# Patient Record
Sex: MALE | Race: BLACK OR AFRICAN AMERICAN | NOT HISPANIC OR LATINO | Employment: UNEMPLOYED | URBAN - METROPOLITAN AREA
[De-identification: names, ages, dates, MRNs, and addresses within clinical notes are randomized per-mention and may not be internally consistent; named-entity substitution may affect disease eponyms.]

---
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
## 2020-09-04 ENCOUNTER — OFFICE VISIT (OUTPATIENT)
Dept: PEDIATRICS | Facility: CLINIC | Age: 20
End: 2020-09-04
Payer: COMMERCIAL

## 2020-09-04 ENCOUNTER — ANCILLARY PROCEDURE (OUTPATIENT)
Dept: GENERAL RADIOLOGY | Facility: CLINIC | Age: 20
End: 2020-09-04
Attending: PEDIATRICS
Payer: COMMERCIAL

## 2020-09-04 VITALS — TEMPERATURE: 98.8 F | HEIGHT: 68 IN | BODY MASS INDEX: 18.43 KG/M2 | WEIGHT: 121.6 LBS

## 2020-09-04 DIAGNOSIS — M54.6 CHRONIC LEFT-SIDED THORACIC BACK PAIN: ICD-10-CM

## 2020-09-04 DIAGNOSIS — M54.6 CHRONIC LEFT-SIDED THORACIC BACK PAIN: Primary | ICD-10-CM

## 2020-09-04 DIAGNOSIS — G89.29 CHRONIC LEFT-SIDED THORACIC BACK PAIN: Primary | ICD-10-CM

## 2020-09-04 DIAGNOSIS — G89.29 CHRONIC LEFT-SIDED THORACIC BACK PAIN: ICD-10-CM

## 2020-09-04 PROCEDURE — 72100 X-RAY EXAM L-S SPINE 2/3 VWS: CPT | Mod: TC

## 2020-09-04 PROCEDURE — 99213 OFFICE O/P EST LOW 20 MIN: CPT | Performed by: PEDIATRICS

## 2020-09-04 ASSESSMENT — ASTHMA QUESTIONNAIRES
QUESTION_4 LAST FOUR WEEKS HOW OFTEN HAVE YOU USED YOUR RESCUE INHALER OR NEBULIZER MEDICATION (SUCH AS ALBUTEROL): ONCE A WEEK OR LESS
QUESTION_2 LAST FOUR WEEKS HOW OFTEN HAVE YOU HAD SHORTNESS OF BREATH: ONCE OR TWICE A WEEK
QUESTION_3 LAST FOUR WEEKS HOW OFTEN DID YOUR ASTHMA SYMPTOMS (WHEEZING, COUGHING, SHORTNESS OF BREATH, CHEST TIGHTNESS OR PAIN) WAKE YOU UP AT NIGHT OR EARLIER THAN USUAL IN THE MORNING: ONCE OR TWICE
QUESTION_5 LAST FOUR WEEKS HOW WOULD YOU RATE YOUR ASTHMA CONTROL: WELL CONTROLLED
HOSPITALIZATION_OVERNIGHT_LAST_YEAR_TOTAL: ONE
ACT_TOTALSCORE: 20
QUESTION_1 LAST FOUR WEEKS HOW MUCH OF THE TIME DID YOUR ASTHMA KEEP YOU FROM GETTING AS MUCH DONE AT WORK, SCHOOL OR AT HOME: A LITTLE OF THE TIME
EMERGENCY_ROOM_LAST_YEAR_TOTAL: ONE

## 2020-09-04 ASSESSMENT — MIFFLIN-ST. JEOR: SCORE: 1540.94

## 2020-09-04 NOTE — PATIENT INSTRUCTIONS
The xray was normal.  This appears to entirely muscle in origin.  Physical Therapy will be the best treatment option.  In the meanwhile. If you can feel the knot in your muscle, put 30 seconds of pressure over that spot.

## 2020-09-04 NOTE — PROGRESS NOTES
Subjective    Duncan Dumont is a 20 year old male who presents to clinic today with self because of:  Musculoskeletal Problem (back pain) and Asthma     HPI   Concerns: Patient is here due to back. He said it has been an on going issue, but the last 4-5 days the pain has been super bad and he cant sleep. He said it feels like a cramp/ache. Hard to describe the feeling. It seems to never go away. He use to wrestle and has had back pain since he was young.    Also needs to fill asthma medication.     Here today for chronic back pain, which has been present for years.  He says he always has a steady level of pain which he rates as 7/10.  He has episodes of more intense pain which he rates at 10/10 although he continues to do his normal daily activities throughout.  He does play a lot of basketball, but does not think these cause the exacerbations.  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  This sometimes makes his back hurt more, but I believe it is just intensification of the background pain.  The pain can be worse at night since it keeps him awake.  There is no radiation or weakness.  He does describe a popping sensation in his lungs that he thinks may be associated with the pain.  No myalgia or arthalgia elsewhere.  No systemic symptoms.    Review of Systems  Constitutional, eye, ENT, skin, respiratory, cardiac, and GI are normal except as otherwise noted.    Problem List  Patient Active Problem List    Diagnosis Date Noted     Marijuana abuse 12/21/2017     Priority: Medium     12/21/2017 + cannibis on urine drug screen.         Left knee pain 11/16/2016     Priority: Medium     Non compliance with medical treatment 01/29/2016     Priority: Medium     CPS report filed - letter under [facility] 01/29/2016     Priority: Medium     State Tier Level:  Unknown  Status:  Accepted  Care Coordinator:  Silva Mar RN CC    See Letters for [facility]  Date:   February 10, 2016             Poor compliance 01/27/2016     Priority: Medium     Failure to thrive in child 12/03/2015     Priority: Medium     Asthma exacerbation 08/22/2015     Priority: Medium     Vitamin D insufficiency 11/21/2014     Priority: Medium     12/21/2017 rx'd vit D.        Chronic allergic rhinitis 11/15/2014     Priority: Medium     Short stature 11/15/2014     Priority: Medium     Referred to endocrinology but did not follow through.  Saw endocrine x 1 while in hospital 8/2015.  Failed outpatient fu.  11/2015 - referred back to endocrinology.        Esophageal reflux 10/14/2013     Priority: Medium     Moderate persistent asthma 11/11/2008     Priority: Medium     Poor control.    Hospitalized x3 at [facility] with acute exacerbations.  No intubations.    Triggers:  Change in weather, pollens?  PHS doing in home education.      10/11/2013 not taking controller med.  Wrote new RX for flovent.  (Singulair too expensive for family.)    8/2015 PICU for status asthmaticus.    10/2015 - controller med switched to aerospan based on insurance coverage.  Referred back to pulmonary.    3/30/2016 - seen in clinic for FU.  Mother just had another baby.  ACT = 21 and seems to be compliant with meds.  Pulmonary appt next week.    12/2015 - now back on Advair.  Never started aerospan.  Did not go to pulmonary appointment.    1/2016 PICU for status asthmaticus.  Discharged on Dulera and montelukast.      3/31/2016 FU in clinic and doing better.  Reports regular use of medications and ACT = 21.        Medications  albuterol (VENTOLIN HFA) 108 (90 Base) MCG/ACT inhaler, Inhale 2 puffs into the lungs every 4 hours as needed for shortness of breath / dyspnea or wheezing  fluticasone (FLONASE) 50 MCG/ACT spray, Spray 1 spray into both nostrils daily  albuterol (2.5 MG/3ML) 0.083% neb solution, Take 2 vials (5 mg) by nebulization every 4 hours as needed for shortness of breath / dyspnea or wheezing  ibuprofen (ADVIL/MOTRIN)  "600 MG tablet, Take 1 tablet (600 mg) by mouth every 6 hours as needed for pain  predniSONE (DELTASONE) 20 MG tablet, Take two tablets (= 40mg) each day for 5 (five) days (Patient not taking: Reported on 9/4/2020)    No current facility-administered medications on file prior to visit.     Allergies  Allergies   Allergen Reactions     Cats      Rash, itching, cough, nasal congestion       Reviewed and updated as needed this visit by Provider  Allergies  Meds  Problems  Med Hx           Objective    Temp 98.8  F (37.1  C) (Oral)   Ht 5' 8.31\" (1.735 m)   Wt 121 lb 9.6 oz (55.2 kg)   BMI 18.32 kg/m    Facility age limit for growth percentiles is 20 years.    Physical Exam  GENERAL APPEARANCE: healthy, alert and no distress  ABDOMEN: soft, nontender, no hepatosplenomegaly or masses and bowel sounds normal  SPINE: Back is straight with very little lumbar lordosis.  No tenderness over the spine itself.  Sacroiliac joint without tenderness.  He can rotate, flex, and extend his spine, but all these maneuvers exacerbate the pain.  PARASPINAL MUSCULATURE: The left serratus posterior inferior muscle has pulses palpably more spasm than the right.  This is all concentrated at the margin of the lower rib cage.  There is a very specific bundle that has a very tight knot.    DIAGNOSTICS:  X-ray of spine: Normal except for a mild curvature to the left, consistent with muscle spasm.        Assessment & Plan    1. Chronic left-sided thoracic back pain  Which appears to be muscle in origin.  His spine appears normal.  He has no symptoms that suggest spinal compression.  He did have an odd popping sensation which is why we obtain the x-ray.  PLAN:  With him lying down, we located the point of maximum tenderness and spasm.  Direct firm pressure over that area for 20 to 30 seconds did give him temporary relief from the pain.  I think he can do this himself by using the knuckles of his hands to obtain temporary relief.  He does not " live with anyone, so he will have to do it himself.  Discussed with him that the best option is physical therapy and made a referral.  - XR Lumbar Spine 2/3 Views; Future  - PHYSICAL THERAPY REFERRAL; Future    Follow Up  Return in about 1 year (around 9/4/2021) for Preventive Care Visit with a family practitioner.  Discussed with him that he is at an age where he really does need transfer care to family practice.    Pepito Campoverde MD

## 2020-09-05 ASSESSMENT — ASTHMA QUESTIONNAIRES: ACT_TOTALSCORE: 20

## 2020-10-27 DIAGNOSIS — J45.42 MODERATE PERSISTENT ASTHMA WITH STATUS ASTHMATICUS: ICD-10-CM

## 2020-10-27 DIAGNOSIS — J45.41 MODERATE PERSISTENT ASTHMA WITH EXACERBATION: ICD-10-CM

## 2020-10-27 RX ORDER — ALBUTEROL SULFATE 90 UG/1
2 AEROSOL, METERED RESPIRATORY (INHALATION) EVERY 4 HOURS PRN
Qty: 1 INHALER | Refills: 0 | Status: CANCELLED | OUTPATIENT
Start: 2020-10-27

## 2020-10-27 RX ORDER — ALBUTEROL SULFATE 90 UG/1
1-2 POWDER, METERED RESPIRATORY (INHALATION) EVERY 4 HOURS PRN
Qty: 1 INHALER | Refills: 0 | Status: SHIPPED | OUTPATIENT
Start: 2020-10-27

## 2020-10-27 NOTE — TELEPHONE ENCOUNTER
Reason for Call:  Medication or medication refill:    Do you use a [facility]?  Name of the pharmacy and phone number for the current request:  see attached    Name of the medication requested: albuterol (VENTOLIN HFA) 108 (90 Base) MCG/ACT inhaler    Other request: N/A    Can we leave a detailed message on this number? Not Applicable    Phone number patient can be reached at: Home number on file [phone] (home)    Best Time: N/A    Call taken on 10/27/2020 at 11:36 AM by Aime Canela MA

## 2022-02-02 ENCOUNTER — HOSPITAL ENCOUNTER (EMERGENCY)
Facility: CLINIC | Age: 22
Discharge: HOME | End: 2022-02-03
Attending: PSYCHIATRY & NEUROLOGY | Admitting: PSYCHIATRY & NEUROLOGY
Payer: COMMERCIAL

## 2022-02-02 DIAGNOSIS — F41.9 ANXIETY: ICD-10-CM

## 2022-02-02 DIAGNOSIS — G47.00 PERSISTENT DISORDER OF INITIATING OR MAINTAINING SLEEP: ICD-10-CM

## 2022-02-02 DIAGNOSIS — F43.0 ACUTE REACTION TO STRESS: ICD-10-CM

## 2022-02-02 DIAGNOSIS — F12.90 MARIJUANA SMOKER: ICD-10-CM

## 2022-02-02 LAB
AMPHETAMINES UR QL SCN: ABNORMAL
BARBITURATES UR QL: ABNORMAL
BENZODIAZ UR QL: ABNORMAL
CANNABINOIDS UR QL SCN: ABNORMAL
COCAINE UR QL: ABNORMAL
OPIATES UR QL SCN: ABNORMAL

## 2022-02-02 PROCEDURE — 250N000013 HC RX MED GY IP 250 OP 250 PS 637: Performed by: PSYCHIATRY & NEUROLOGY

## 2022-02-02 PROCEDURE — 80307 DRUG TEST PRSMV CHEM ANLYZR: CPT | Performed by: PSYCHIATRY & NEUROLOGY

## 2022-02-02 PROCEDURE — 99284 EMERGENCY DEPT VISIT MOD MDM: CPT | Performed by: PSYCHIATRY & NEUROLOGY

## 2022-02-02 PROCEDURE — 90791 PSYCH DIAGNOSTIC EVALUATION: CPT

## 2022-02-02 PROCEDURE — 99285 EMERGENCY DEPT VISIT HI MDM: CPT | Mod: 25

## 2022-02-02 RX ORDER — OLANZAPINE 5 MG/1
5 TABLET, ORALLY DISINTEGRATING ORAL AT BEDTIME
Qty: 10 TABLET | Refills: 0 | Status: SHIPPED | OUTPATIENT
Start: 2022-02-02 | End: 2022-02-02

## 2022-02-02 RX ORDER — OLANZAPINE 5 MG/1
5 TABLET, ORALLY DISINTEGRATING ORAL AT BEDTIME
Qty: 10 TABLET | Refills: 0 | Status: SHIPPED | OUTPATIENT
Start: 2022-02-02 | End: 2022-11-29

## 2022-02-02 RX ORDER — OLANZAPINE 10 MG/1
10 TABLET, ORALLY DISINTEGRATING ORAL ONCE
Status: COMPLETED | OUTPATIENT
Start: 2022-02-02 | End: 2022-02-02

## 2022-02-02 RX ADMIN — OLANZAPINE 10 MG: 10 TABLET, ORALLY DISINTEGRATING ORAL at 18:37

## 2022-02-02 ASSESSMENT — ENCOUNTER SYMPTOMS
NEUROLOGICAL NEGATIVE: 1
GASTROINTESTINAL NEGATIVE: 1
CARDIOVASCULAR NEGATIVE: 1
CONSTITUTIONAL NEGATIVE: 1
NERVOUS/ANXIOUS: 1
EYES NEGATIVE: 1
RESPIRATORY NEGATIVE: 1
DECREASED CONCENTRATION: 1
HALLUCINATIONS: 0
MUSCULOSKELETAL NEGATIVE: 1
SLEEP DISTURBANCE: 1

## 2022-02-02 NOTE — ED PROVIDER NOTES
[facility] EMERGENCY DEPARTMENT ([facility])    2/02/22       History     Chief Complaint   Patient presents with     Manic Behavior     per pt he has been having racing thoughts x 2 days, has not been able to sleep. Denies any SI/HI     The history is provided by the patient and medical records.     Duncan Dumont is a 21 year old male with history of asthma who presents with racing thoughts and insomnia for the past 2 days. Epic records reviewed. He doesn't have much by way of psychiatric history other than episode of altered mental status with psychotic component and visual hallucinations on 1/19/12. He was evaluated for toxic metabolic causes of altered mental status. Patient was anxious and restless on arrival. He was given a dose of Zyprexa which has sedated him. He admits to having trouble sleeping due to racing thoughts. He cannot identify a trigger. He is positive for THC and admits to ongoing use, but denies using more or other substances. He has history of abusing opiates and complains of chronic back pain that has lasted 8 years. Patient denies feeling suicidal. He denies command hallucinations. He now is overly sedated and wants to sleep, not feeling ready to go home. He agrees to getting started on a medication to help with sleeping, calming the racing thoughts and seeing a therapist.     Patient reports that he has too many demands from family when he is home. This also contributes to his anxiety, worry and disrupted sleep.    Please see [facility] Crisis Assessment on 2/2/22 in Epic for further details.    PERSONAL MEDICAL HISTORY  Past Medical History:   Diagnosis Date     Asthma exacerbation 10/11/2013    Hospitalized 10/11-10/14/13     Asthma exacerbation     Hospitalized 8/22-8/25/15 - PICU     Asthma exacerbation     Hospitalized 1/28/16 - PICU     Constipation     required enema in 2004     Moderate persistent asthma 11/11/2008    Hospitalized 11/5-11/8/08 at [facility] with acute  exacerbation.       Moderate persistent asthma 2008    Hospitalized -2009     Moderate persistent asthma     Hospitalized 2/     PAST SURGICAL HISTORY  Past Surgical History:   Procedure Laterality Date     CIRCUMCISION,OTHER,<28 D/O      as      FAMILY HISTORY  Family History   Problem Relation Age of Onset     Asthma Mother      Allergies Mother      SOCIAL HISTORY  Social History     Tobacco Use     Smoking status: Never Smoker     Smokeless tobacco: Never Used     Tobacco comment: mom quit   Substance Use Topics     Alcohol use: No     Alcohol/week: 0.0 standard drinks     MEDICATIONS  No current facility-administered medications for this encounter.     Current Outpatient Medications   Medication     albuterol (2.5 MG/3ML) 0.083% neb solution     albuterol (PROAIR RESPICLICK) 108 (90 Base) MCG/ACT inhaler     fluticasone (FLONASE) 50 MCG/ACT spray     ibuprofen (ADVIL/MOTRIN) 600 MG tablet     predniSONE (DELTASONE) 20 MG tablet     ALLERGIES  Allergies   Allergen Reactions     Cats      Rash, itching, cough, nasal congestion            Review of Systems   Constitutional: Negative.    HENT: Negative.    Eyes: Negative.    Respiratory: Negative.    Cardiovascular: Negative.    Gastrointestinal: Negative.    Genitourinary: Negative.    Musculoskeletal: Negative.    Skin: Negative.    Neurological: Negative.    Psychiatric/Behavioral: Positive for decreased concentration and sleep disturbance. Negative for hallucinations and suicidal ideas. The patient is nervous/anxious.    All other systems reviewed and are negative.        Physical Exam   BP: 101/62  Pulse: 75  Temp: 98  F (36.7  C)  Resp: 18  Weight: 55.8 kg (123 lb)  SpO2: 97 %  Physical Exam  Vitals and nursing note reviewed.   HENT:      Head: Normocephalic.   Eyes:      Pupils: Pupils are equal, round, and reactive to light.   Pulmonary:      Effort: Pulmonary effort is normal.   Musculoskeletal:         General: Normal  range of motion.      Cervical back: Normal range of motion.   Neurological:      General: No focal deficit present.      Mental Status: He is alert.   Psychiatric:         Attention and Perception: He does not perceive auditory or visual hallucinations.         Mood and Affect: Mood is anxious.         Speech: Speech normal.         Behavior: Behavior normal. Behavior is not agitated, aggressive, hyperactive or combative. Behavior is cooperative.         Thought Content: Thought content normal. Thought content does not include homicidal or suicidal ideation.         Cognition and Memory: Cognition and memory normal.         Judgment: Judgment normal.         ED Course      Procedures             Results for orders placed or performed during the hospital encounter of 02/02/22   Drug abuse screen 1 urine (ED)     Status: Abnormal   Result Value Ref Range    Amphetamines Urine Screen Negative Screen Negative    Barbiturates Urine Screen Negative Screen Negative    Benzodiazepines Urine Screen Negative Screen Negative    Cannabinoids Urine Screen Positive (A) Screen Negative    Cocaine Urine Screen Negative Screen Negative    Opiates Urine Screen Negative Screen Negative   Urine Drugs of Abuse Screen     Status: Abnormal    Narrative    The following orders were created for panel order Urine Drugs of Abuse Screen.  Procedure                               Abnormality         Status                     ---------                               -----------         ------                     Drug abuse screen 1 urin...[[id number]]  Abnormal            Final result                 Please view results for these tests on the individual orders.     Medications   OLANZapine zydis (zyPREXA) ODT tab 10 mg (10 mg Oral Given 2/2/22 7197)        Assessments & Plan (with Medical Decision Making)   Patient with history of THC use who is concerned about racing thoughts past couple days that are interfering with his ability to sleep. He  was restless and anxious on presentation and received a dose of Zyprexa, which resulted in him wanting to sleep now. He agrees to take such a medication on discharge. He is prescribed Zyprexa 5 mg, #10.    Patient is not ready to leave as he feels overly sedated. He will be allowed to sleep here, likely overnight. [facility] made a referral for therapy for outpatient support. He can be discharged when ready.    I have reviewed the nursing notes. I have reviewed the findings, diagnosis, plan and need for follow up with the patient.    New Prescriptions    No medications on file       Final diagnoses:   None       --    [facility] EMERGENCY DEPARTMENT  2/2/2022     Keanu Franz MD  02/02/22 0501

## 2022-02-03 VITALS
DIASTOLIC BLOOD PRESSURE: 62 MMHG | TEMPERATURE: 97.1 F | BODY MASS INDEX: 18.53 KG/M2 | SYSTOLIC BLOOD PRESSURE: 96 MMHG | WEIGHT: 123 LBS | RESPIRATION RATE: 16 BRPM | OXYGEN SATURATION: 99 % | HEART RATE: 60 BPM

## 2022-02-03 NOTE — ED NOTES
2/2/2022  [place] EMILY Tim 2000     [facility] Crisis Assessment    Patient was assessed: remote  Patient location: [facility]    Referral Data and Chief Complaint  Pt is a 21 year old who uses he/him pronouns. Patient presented to the ED with family/friends and was referred to the ED by self. Patient is presenting to the ED for the following concerns: insomnia, racing thoughts.      Informed Consent and Assessment Methods    Patient is his own guardian. Writer met with patient and explained the crisis assessment process, including applicable information disclosures and limits to confidentiality, assessed understanding of the process, and obtained consent to proceed with the assessment. Patient was observed to be able to participate in the assessment as evidenced by verbal consent. Assessment methods included conducting a formal interview with patient, review of medical records, collaboration with medical staff, and obtaining relevant collateral information from family and community providers when available.    Narrative Summary of Presenting Problem and Current Functioning  What led to the patient presenting for crisis services, factors that make the crisis life threatening or complex, stressors, how is this disrupting the patient's life, and how current functioning is in comparison to baseline. How is patient presenting during the assessment.     Pt was contradictory in many of his answers, he reported not being able to sleep for the last two days but also reported he slept for 3-5 hours early this morning. Pt repeatedly said he does not feel well physically and that he can not describe how he feels, pt reported that he can not relax and is having racing thoughts. Pt reported he only uses marijuana but also reported he used to use Percocet. Pt repeatedly said he has back pain from an accident 8 years ago that he never got checked out, pt asked for pain relief for his back. Pt reported that he is hearing voices,  "but said he doesn't know what they are saying other than he can hear himself say \"you can't do it.\" He reported he also hears himself telling himself to get help.      History of the Crisis  Duration of the current crisis, coping skills attempted to reduce the crisis, community resources used, and past presentations.    Pt reported that he has been feeling like this for two days and he has never felt like this before. He has very little medical history in regards to mental health, he denies feeling suicidal, denies ever making a suicide attempt, denies any previous mental health admissions and denies any previous CD treatment. Pt reported he does not have any providers and has never seen a therapist. Pt reported he has court in the next couple of days and is facing assault and drug charges but reported that the charges will be dropped at that time.    Collateral Information  Pt's girlfriend was present during the first part of the assessment and added to some of pt's answers when he could not form a thought. Pt's medical records were reviewed.    Risk Assessment    Risk of Harm to Self     ESS-6  1.a. Over the past 2 weeks, have you had thoughts of killing yourself? No  1.b. Have you ever attempted to kill yourself and, if yes, when did this last happen? No   2. Recent or current suicide plan? No   3. Recent or current intent to act on ideation? No  4. Lifetime psychiatric hospitalization? No  5. Pattern of excessive substance use? Yes  6. Current irritability, agitation, or aggression? No  Scoring note: BOTH 1a and 1b must be yes for it to score 1 point, if both are not yes it is zero. All others are 1 point per number. If all questions 1a/1b - 6 are no, risk is negligible. If one of 1a/1b is yes, then risk is mild. If either question 2 or 3, but not both, is yes, then risk is automatically moderate regardless of total score. If both 2 and 3 are yes, risk is automatically high regardless of total score.     Score: " 0, mild risk    The patient has the following risk factors for suicide: substance abuse, depressive symptoms, chronic pain, isolation, lack of support, poor decision making, poor impulse control and restless/agitated    Is the patient experiencing current suicidal ideation: No    Is the patient engaging in preparatory suicide behaviors (formulating how to act on plan, giving away possessions, saying goodbye, displaying dramatic behavior changes, etc)? No    Does the patient have access to firearms or other lethal means? no    The patient has the following protective factors: social support, voluntarily seeking mental health support and safe/stable housing    Support system information: girlfriend and friends    Patient strengths: Pt is able to seek emergency help.    Does the patient engage in non-suicidal self-injurious behavior (NSSI/SIB)? no    Is the patient vulnerable to sexual exploitation?  No    Is the patient experiencing abuse or neglect? no    Is the patient a vulnerable adult? No      Risk of Harm to Others  The patient has the following risk factors of harm to others: no risk factors identified    Does the patient have thoughts of harming others? No    Is the patient engaging in sexually inappropriate behavior?  no       Current Substance Abuse    Is there recent substance abuse? Marijuana, daily, last use yesterday or today    Was a urine drug screen or blood alcohol level obtained: Yes, positive for cannabiniods          Current Symptoms/Concerns    Symptoms  Attention, hyperactivity, and impulsivity symptoms present: No    Anxiety symptoms present: Yes: Generalized Symptoms: Agitation, Cognitive anxiety - feelings of doom, racing thoughts, difficulty concentrating , Excessive worry and Physiological anxiety - sweating, flushing, shaking, shortness of breath, or racing heart      Appetite symptoms present: Yes: Loss of Appetite     Behavioral difficulties present: Yes: Withdrawal/Isolation      Cognitive impairment symptoms present: Yes: Decision-Making and Judgment/Insight    Depressive symptoms present: No    Eating disorder symptoms present: No    Learning disabilities, cognitive challenges, and/or developmental disorder symptoms present: No     Manic/hypomanic symptoms present: No    Personality and interpersonal functioning difficulties present : Yes: Emotional Deregulation and Impaired Interpersonal Functioning    Psychosis symptoms present: Yes: Hallucinations: Auditory      Sleep difficulties present: Yes: Difficulty falling asleep  and Difficulty staying sleep     Substance abuse disorder symptoms present: Yes Substance(s) taken in larger amounts or over a longer period than intended     Trauma and stressor related symptoms present: No           Mental Status Exam   Affect: Dramatic   Appearance: Appropriate and Disheveled    Attention Span/Concentration: Attentive?    Eye Contact: Engaged   Fund of Knowledge: Delayed    Language /Speech Content: Fluent   Language /Speech Volume: Normal    Language /Speech Rate/Productions: Hyperverbal and Normal    Recent Memory: Variable   Remote Memory: Variable   Mood: Anxious and Irritable    Orientation to Person: Yes    Orientation to Place: Yes   Orientation to Time of Day: Yes    Orientation to Date: Yes    Situation (Do they understand why they are here?): Yes    Psychomotor Behavior: Agitated    Thought Content: Clear   Thought Form: Intact       Mental Health and Substance Abuse History    History  Current and historical diagnoses or mental health concerns: denies    Prior MH services (inpatient, programmatic care, outpatient, etc) : No    History of substance abuse: Yes daily marijuna use    Prior SANDY services (inpatient, programmatic care, detox, outpatient, etc) : No    History of commitment: No    Family history of MH/SANDY: Yes reports siblings, aunts, uncles and mother all kwong addiction    Trauma history: No    Medication  Psychotropic  medications: No    Current Care Team  Primary Care Provider: No    Psychiatrist: No    Therapist: No    : No    CTSS or ARMHS: No    ACT Team: No    Other: No    Release of Information  Was a release of information signed: No. Reason: no providers to release to      Biopsychosocial Information    Socioeconomic Information  Current living situation: lives with friend    Employment/income source: works as a     Relevant legal issues: has court in a few days, facing assault and drug possession charges (pt not clear on this)    Cultural, Latter day, or spiritual influences on mental health care: no    Is the patient active in the  or a : No      Relevant Medical Concerns   Patient identifies concerns with completing ADLs? No     Patient can ambulate independently? Yes     Other medical concerns? No     History of concussion or TBI? 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      Disposition  Recommended disposition: Individual Therapy and Substance Abuse Disorder Treatment      Reviewed case and recommendations with attending provider. Attending Name: Dr. Franz      Attending concurs with disposition: Yes      Patient concurs with disposition: Yes      Guardian concurs with disposition: NA     Final disposition: Individual therapy  and Substance abuse disorder treatment .         Clinical Substantiation of Recommendations   Rationale with supporting factors for disposition and diagnosis.     Pt seems to be suffering from opioid withdrawal but reports only using marijuana, pt does not meet criteria for admission, pt does not report  "any SI or self-harm. Pt is open and willing to try individual therapy.      Assessment Details  Patient interview started at: 7:35pm and completed at: 8:45pm.    Total duration spent on the patient case in minutes: 1.25 hrs     CPT code(s) utilized: 59623 - Psychotherapy for Crisis - 60 (30-74*) min       Aftercare and Safety Planning  Follow up plans with MH/SANDY services: No      Aftercare plan placed in the AVS and provided to patient: Yes. Given to patient by nurse    LIAN Chong      Aftercare Plan  If I am feeling unsafe or I am in a crisis, I will:   Contact my established care providers   Call the National Suicide Prevention Lifeline: 925.823.2092   Go to the nearest emergency room   Call 433     Warning signs that I or other people might notice when a crisis is developing for me: any thoughts of SI or self harm    Things I am able to do on my own to cope or help me feel better: playing basketball, cooking, playing video games     Things that I am able to do with others to cope or help me better: talking to girlfriend and friends    Things I can use or do for distraction: being with others, cooking, playing basketball, being outside, playing video games    Changes I can make to support my mental health and wellness: eat healthy, exercise     People in my life that I can ask for help: girlfriend    Your [facility] has a mental health crisis team you can call 24/7: [facility] Mobile Crisis  [phone] (adults)  [phone] (children)        Crisis Lines  Crisis Text Line  Text 775550  You will be connected with a trained live crisis counselor to provide support.    National Hope Line  1.800.SUICIDE [6154096]      Community Resources  Fast Tracker  Linking people to mental health and substance use disorder resources  fasttrackermn.org     [place] [facility] Warm Line  Peer to peer support  Monday thru Saturday, 12 pm to 10 pm  145.644.6269 or 1.845.810.8272  Text \"Support\" to " 10261    [facility] [place] [facility] (DARRIUS)  024.487.8134 or 1.888.[facility].HELPS        Mental Health Apps  My3  https://myOstial Solutionspp.org/    VirtualHopeBox  https://Naytev.org/apps/virtual-hope-box/

## 2022-02-03 NOTE — DISCHARGE INSTRUCTIONS
Start trial of olanzapine to help with sleep issues and racing thoughts.  Please stop marijuana use and it could contribute to anxiety and racing thoughts.    You are scheduled for the following appointment:  Date: Wednesday, 2/9/2022  Time: 1:00 pm - 2:00 pm  Provider: Emily Green  [facility]  Location: [facility], [address] [place] [address], [address], [place] [address]  Phone: [phone]  Type: Therapy - Initial (In-Person)    Scheduling Instructions  In person Tuesdays, Wednesdays. Does not work with individuals who are court ordered. Does not take Medicare. Please contact [email or web address] for any scheduling questions or concerns.

## 2022-09-13 ENCOUNTER — OFFICE VISIT (OUTPATIENT)
Dept: URGENT CARE | Facility: URGENT CARE | Age: 22
End: 2022-09-13
Payer: MEDICAID

## 2022-09-13 VITALS
WEIGHT: 127 LBS | RESPIRATION RATE: 16 BRPM | DIASTOLIC BLOOD PRESSURE: 67 MMHG | SYSTOLIC BLOOD PRESSURE: 110 MMHG | HEART RATE: 53 BPM | BODY MASS INDEX: 19.14 KG/M2 | OXYGEN SATURATION: 99 % | TEMPERATURE: 97.6 F

## 2022-09-13 DIAGNOSIS — G89.29 CHRONIC LEFT-SIDED THORACIC BACK PAIN: Primary | ICD-10-CM

## 2022-09-13 DIAGNOSIS — J45.909 UNCOMPLICATED ASTHMA, UNSPECIFIED ASTHMA SEVERITY, UNSPECIFIED WHETHER PERSISTENT: ICD-10-CM

## 2022-09-13 DIAGNOSIS — M54.6 CHRONIC LEFT-SIDED THORACIC BACK PAIN: Primary | ICD-10-CM

## 2022-09-13 DIAGNOSIS — J30.2 SEASONAL ALLERGIC RHINITIS, UNSPECIFIED TRIGGER: ICD-10-CM

## 2022-09-13 DIAGNOSIS — B35.1 FUNGAL INFECTION OF TOENAIL: ICD-10-CM

## 2022-09-13 PROCEDURE — 99214 OFFICE O/P EST MOD 30 MIN: CPT | Performed by: PHYSICIAN ASSISTANT

## 2022-09-13 RX ORDER — PREDNISONE 20 MG/1
20 TABLET ORAL DAILY
Qty: 5 TABLET | Refills: 0 | Status: SHIPPED | OUTPATIENT
Start: 2022-09-13 | End: 2022-09-18

## 2022-09-13 RX ORDER — CETIRIZINE HYDROCHLORIDE 10 MG/1
10 TABLET ORAL DAILY
Qty: 30 TABLET | Refills: 0 | Status: SHIPPED | OUTPATIENT
Start: 2022-09-13 | End: 2022-10-13

## 2022-09-13 RX ORDER — ALBUTEROL SULFATE 90 UG/1
1-2 AEROSOL, METERED RESPIRATORY (INHALATION) EVERY 6 HOURS
Qty: 18 G | Refills: 1 | Status: SHIPPED | OUTPATIENT
Start: 2022-09-13

## 2022-09-13 RX ORDER — CYCLOBENZAPRINE HCL 5 MG
5 TABLET ORAL
Qty: 15 TABLET | Refills: 0 | Status: SHIPPED | OUTPATIENT
Start: 2022-09-13 | End: 2022-09-28

## 2022-09-13 RX ORDER — KETOCONAZOLE 20 MG/G
CREAM TOPICAL 2 TIMES DAILY
Qty: 15 G | Refills: 0 | Status: SHIPPED | OUTPATIENT
Start: 2022-09-13 | End: 2022-09-27

## 2022-09-13 ASSESSMENT — PAIN SCALES - GENERAL: PAINLEVEL: WORST PAIN (10)

## 2022-09-13 NOTE — PROGRESS NOTES
ASSESSMENT:     ICD-10-CM    1. Chronic left-sided thoracic back pain  M54.6 XR Thoracic Spine 2 Views    G89.29 predniSONE (DELTASONE) 20 MG tablet     cyclobenzaprine (FLEXERIL) 5 MG tablet   2. Uncomplicated asthma, unspecified asthma severity, unspecified whether persistent  J45.909 albuterol (PROAIR HFA/PROVENTIL HFA/VENTOLIN HFA) 108 (90 Base) MCG/ACT inhaler     cetirizine (ZYRTEC) 10 MG tablet     predniSONE (DELTASONE) 20 MG tablet   3. Seasonal allergic rhinitis, unspecified trigger  J30.2 albuterol (PROAIR HFA/PROVENTIL HFA/VENTOLIN HFA) 108 (90 Base) MCG/ACT inhaler     cetirizine (ZYRTEC) 10 MG tablet     predniSONE (DELTASONE) 20 MG tablet   4. Fungal infection of toenail  B35.1 ketoconazole (NIZORAL) 2 % external cream           PLAN: Vital signs stable.  Chronic left-sided scapular pain, likely musculoskeletal.  Prednisone for this and the asthma.  Do not take ibuprofen while taking the prednisone.  Flexeril muscle relaxant at bedtime.  Ketoconazole cream for toenail.  Zyrtec for seasonal allergies, albuterol inhaler prescribed.  I have discussed clinical findings with patient.  Side effects of medications discussed.  Symptomatic care is discussed.  I have discussed the possibility of  worsening symptoms and indication to RTC or go to the ER if they occur.  All questions are answered, patient indicates understanding of these issues and is in agreement with plan.   Patient care instructions are discussed/given at the end of visit.   Lots of rest and fluids.  Follow-up with primary care provider in a couple of weeks.  Patient will also stop at  to sign up for MyChart.    Rashida Downey PA-C      SUBJECTIVE:  22-year-old Daily marijuana smoker presents for several concerns: #1 great toenail nail thickness and yellowing.  #2 asthma flare, need for albuterol refill.  Has seasonal allergies but not currently taking anything for it.  #3 chronic left-sided thoracic back pain.  No  nausea, vomiting, flank pain, fever, headache, dizziness, weight loss, nausea, vomiting, hematuria, dysuria.  No specific injury.  Chart reviewed and noted he was seen for left thoracic back pain in September 2020.  Had lumbar films done at that time.  No upper or lower extremity radicular pain, numbness, tingling, weakness.  No bowel or bladder trouble.  Only medications at this time are ibuprofen and albuterol inhaler.        Allergies   Allergen Reactions     Cats      Rash, itching, cough, nasal congestion         Past Medical History:   Diagnosis Date     Asthma exacerbation 10/11/2013    Hospitalized 10/11-10/14/13     Asthma exacerbation     Hospitalized 8/22-8/25/15 - PICU     Asthma exacerbation     Hospitalized 1/28/16 - PICU     Constipation     required enema in 2004     Moderate persistent asthma 11/11/2008    Hospitalized 11/5-11/8/08 at [facility] with acute exacerbation.       Moderate persistent asthma 11/11/2008    Hospitalized 1/25-1/28/2009     Moderate persistent asthma     Hospitalized 2/10-2/12/2010       albuterol (2.5 MG/3ML) 0.083% neb solution, Take 2 vials (5 mg) by nebulization every 4 hours as needed for shortness of breath / dyspnea or wheezing  albuterol (PROAIR RESPICLICK) 108 (90 Base) MCG/ACT inhaler, Inhale 1-2 puffs into the lungs every 4 hours as needed for shortness of breath / dyspnea or wheezing  fluticasone (FLONASE) 50 MCG/ACT spray, Spray 1 spray into both nostrils daily  ibuprofen (ADVIL/MOTRIN) 600 MG tablet, Take 1 tablet (600 mg) by mouth every 6 hours as needed for pain  OLANZapine zydis (ZYPREXA) 5 MG ODT, Take 1 tablet (5 mg) by mouth At Bedtime  predniSONE (DELTASONE) 20 MG tablet, Take two tablets (= 40mg) each day for 5 (five) days    No current facility-administered medications on file prior to visit.      Social History     Tobacco Use     Smoking status: Never Smoker     Smokeless tobacco: Never Used     Tobacco comment: mom quit   Substance Use Topics     Alcohol  use: No     Alcohol/week: 0.0 standard drinks       ROS:  CONSTITUTIONAL: Negative for fatigue or fever.  EYES: Negative for eye problems.  ENT: Neg for ST.  RESP: As above.  CV: Negative for chest pains.  GI: Negative for vomiting.  MUSCULOSKELETAL: as above.  NEUROLOGIC: Negative for headaches.  SKIN: Negative for rash.  PSYCH: Normal mentation for age.    OBJECTIVE:  /67   Pulse 53   Temp 97.6  F (36.4  C) (Tympanic)   Resp 16   Wt 57.6 kg (127 lb)   SpO2 99%   BMI 19.14 kg/m      GENERAL APPEARANCE: Healthy, alert and no distress.  EYES:Conjunctiva/sclera clear.  EARS: No cerumen.   Ear canals w/o erythema.  TM's intact w/o erythema.    NOSE/MOUTH: Nose without ulcers, erythema or lesions.  SINUSES: No maxillary sinus tenderness.  THROAT: No erythema w/o tonsillar enlargement . No exudates.  NECK: Supple, nontender, no lymphadenopathy.  RESP: Lungs clear to auscultation - no rales, rhonchi or wheezes  CV: Regular rate and rhythm, normal S1 S2, no murmur noted.  NEURO: Awake, alert    SKIN: No rashes  Back-cervical, thoracic, lumbar spine nontender to palpation.  Full flexion and extension with some discomfort with extension.  Strength and gait are normal.  Area of pain is over the left scapular area.  Mild tenderness with deep palpation only.  No significant scoliosis noted.  Great toenail is thick, yellow, scaling.    Rashida Downey PA-C

## 2022-10-17 ENCOUNTER — TELEPHONE (OUTPATIENT)
Dept: URGENT CARE | Facility: URGENT CARE | Age: 22
End: 2022-10-17

## 2022-10-17 NOTE — TELEPHONE ENCOUNTER
Patient calling requesting results from x-ray completed during 9/13  visit in [place]. Patient relayed provider note that states image results came back normal.    Patient requesting follow up appointment to discuss ongoing symptoms. No availability at [facility]. Patient was warm transferred to central scheduling for assistance in scheduling in office appointment with any provider at any location.    Karina Pretty RN    [facility]

## 2022-11-21 ENCOUNTER — HEALTH MAINTENANCE LETTER (OUTPATIENT)
Age: 22
End: 2022-11-21

## 2022-11-29 ENCOUNTER — HOSPITAL ENCOUNTER (EMERGENCY)
Facility: CLINIC | Age: 22
Discharge: HOME | End: 2022-11-29
Attending: EMERGENCY MEDICINE | Admitting: EMERGENCY MEDICINE
Payer: COMMERCIAL

## 2022-11-29 ENCOUNTER — APPOINTMENT (OUTPATIENT)
Dept: GENERAL RADIOLOGY | Facility: CLINIC | Age: 22
End: 2022-11-29
Attending: EMERGENCY MEDICINE
Payer: COMMERCIAL

## 2022-11-29 ENCOUNTER — OFFICE VISIT (OUTPATIENT)
Dept: URGENT CARE | Facility: URGENT CARE | Age: 22
End: 2022-11-29
Payer: COMMERCIAL

## 2022-11-29 VITALS
TEMPERATURE: 98.1 F | SYSTOLIC BLOOD PRESSURE: 109 MMHG | BODY MASS INDEX: 18.96 KG/M2 | HEART RATE: 96 BPM | WEIGHT: 140 LBS | DIASTOLIC BLOOD PRESSURE: 70 MMHG | HEIGHT: 72 IN | OXYGEN SATURATION: 94 %

## 2022-11-29 VITALS
TEMPERATURE: 98.2 F | RESPIRATION RATE: 20 BRPM | HEART RATE: 88 BPM | DIASTOLIC BLOOD PRESSURE: 75 MMHG | SYSTOLIC BLOOD PRESSURE: 115 MMHG | OXYGEN SATURATION: 94 %

## 2022-11-29 DIAGNOSIS — J45.41 MODERATE PERSISTENT ASTHMA WITH EXACERBATION: Primary | ICD-10-CM

## 2022-11-29 DIAGNOSIS — M54.50 CHRONIC LEFT-SIDED LOW BACK PAIN WITHOUT SCIATICA: ICD-10-CM

## 2022-11-29 DIAGNOSIS — J10.1 INFLUENZA A: ICD-10-CM

## 2022-11-29 DIAGNOSIS — J45.41 MODERATE PERSISTENT ASTHMA WITH EXACERBATION: ICD-10-CM

## 2022-11-29 DIAGNOSIS — G89.29 CHRONIC LEFT-SIDED LOW BACK PAIN WITHOUT SCIATICA: ICD-10-CM

## 2022-11-29 LAB
ANION GAP SERPL CALCULATED.3IONS-SCNC: 7 MMOL/L (ref 3–14)
BASOPHILS # BLD AUTO: 0 10E3/UL (ref 0–0.2)
BASOPHILS NFR BLD AUTO: 0 %
BUN SERPL-MCNC: 19 MG/DL (ref 7–30)
CALCIUM SERPL-MCNC: 9.2 MG/DL (ref 8.5–10.1)
CHLORIDE BLD-SCNC: 101 MMOL/L (ref 94–109)
CO2 SERPL-SCNC: 31 MMOL/L (ref 20–32)
CREAT SERPL-MCNC: 1.14 MG/DL (ref 0.66–1.25)
EOSINOPHIL # BLD AUTO: 0 10E3/UL (ref 0–0.7)
EOSINOPHIL NFR BLD AUTO: 0 %
ERYTHROCYTE [DISTWIDTH] IN BLOOD BY AUTOMATED COUNT: 13.2 % (ref 10–15)
GFR SERPL CREATININE-BSD FRML MDRD: >90 ML/MIN/1.73M2
GLUCOSE BLD-MCNC: 123 MG/DL (ref 70–99)
HCT VFR BLD AUTO: 41.5 % (ref 40–53)
HGB BLD-MCNC: 14.1 G/DL (ref 13.3–17.7)
IMM GRANULOCYTES # BLD: 0 10E3/UL
IMM GRANULOCYTES NFR BLD: 0 %
LYMPHOCYTES # BLD AUTO: 0.6 10E3/UL (ref 0.8–5.3)
LYMPHOCYTES NFR BLD AUTO: 5 %
MCH RBC QN AUTO: 28.8 PG (ref 26.5–33)
MCHC RBC AUTO-ENTMCNC: 34 G/DL (ref 31.5–36.5)
MCV RBC AUTO: 85 FL (ref 78–100)
MONOCYTES # BLD AUTO: 0.6 10E3/UL (ref 0–1.3)
MONOCYTES NFR BLD AUTO: 5 %
NEUTROPHILS # BLD AUTO: 10.4 10E3/UL (ref 1.6–8.3)
NEUTROPHILS NFR BLD AUTO: 90 %
NRBC # BLD AUTO: 0 10E3/UL
NRBC BLD AUTO-RTO: 0 /100
PLATELET # BLD AUTO: 223 10E3/UL (ref 150–450)
POTASSIUM BLD-SCNC: 4.3 MMOL/L (ref 3.4–5.3)
RBC # BLD AUTO: 4.9 10E6/UL (ref 4.4–5.9)
SODIUM SERPL-SCNC: 139 MMOL/L (ref 133–144)
WBC # BLD AUTO: 11.5 10E3/UL (ref 4–11)

## 2022-11-29 PROCEDURE — 99284 EMERGENCY DEPT VISIT MOD MDM: CPT | Performed by: EMERGENCY MEDICINE

## 2022-11-29 PROCEDURE — 250N000013 HC RX MED GY IP 250 OP 250 PS 637: Performed by: EMERGENCY MEDICINE

## 2022-11-29 PROCEDURE — 96361 HYDRATE IV INFUSION ADD-ON: CPT | Performed by: EMERGENCY MEDICINE

## 2022-11-29 PROCEDURE — 71046 X-RAY EXAM CHEST 2 VIEWS: CPT

## 2022-11-29 PROCEDURE — 96372 THER/PROPH/DIAG INJ SC/IM: CPT | Performed by: PHYSICIAN ASSISTANT

## 2022-11-29 PROCEDURE — 99284 EMERGENCY DEPT VISIT MOD MDM: CPT | Mod: 25 | Performed by: EMERGENCY MEDICINE

## 2022-11-29 PROCEDURE — 94640 AIRWAY INHALATION TREATMENT: CPT | Performed by: PHYSICIAN ASSISTANT

## 2022-11-29 PROCEDURE — 82310 ASSAY OF CALCIUM: CPT | Performed by: EMERGENCY MEDICINE

## 2022-11-29 PROCEDURE — 36415 COLL VENOUS BLD VENIPUNCTURE: CPT | Performed by: EMERGENCY MEDICINE

## 2022-11-29 PROCEDURE — 99214 OFFICE O/P EST MOD 30 MIN: CPT | Mod: 25 | Performed by: PHYSICIAN ASSISTANT

## 2022-11-29 PROCEDURE — 85025 COMPLETE CBC W/AUTO DIFF WBC: CPT | Performed by: EMERGENCY MEDICINE

## 2022-11-29 PROCEDURE — 94640 AIRWAY INHALATION TREATMENT: CPT | Performed by: EMERGENCY MEDICINE

## 2022-11-29 PROCEDURE — 250N000011 HC RX IP 250 OP 636: Performed by: EMERGENCY MEDICINE

## 2022-11-29 PROCEDURE — 250N000009 HC RX 250: Performed by: EMERGENCY MEDICINE

## 2022-11-29 PROCEDURE — 258N000003 HC RX IP 258 OP 636: Performed by: EMERGENCY MEDICINE

## 2022-11-29 PROCEDURE — 96374 THER/PROPH/DIAG INJ IV PUSH: CPT | Performed by: EMERGENCY MEDICINE

## 2022-11-29 RX ORDER — IPRATROPIUM BROMIDE AND ALBUTEROL SULFATE 2.5; .5 MG/3ML; MG/3ML
1 SOLUTION RESPIRATORY (INHALATION) EVERY 4 HOURS PRN
Qty: 90 ML | Refills: 0 | Status: SHIPPED | OUTPATIENT
Start: 2022-11-29

## 2022-11-29 RX ORDER — IPRATROPIUM BROMIDE AND ALBUTEROL SULFATE 2.5; .5 MG/3ML; MG/3ML
3 SOLUTION RESPIRATORY (INHALATION) ONCE
Status: COMPLETED | OUTPATIENT
Start: 2022-11-29 | End: 2022-11-29

## 2022-11-29 RX ORDER — ALBUTEROL SULFATE 90 UG/1
2 AEROSOL, METERED RESPIRATORY (INHALATION) ONCE
Status: COMPLETED | OUTPATIENT
Start: 2022-11-29 | End: 2022-11-29

## 2022-11-29 RX ORDER — KETOROLAC TROMETHAMINE 15 MG/ML
15 INJECTION, SOLUTION INTRAMUSCULAR; INTRAVENOUS ONCE
Status: COMPLETED | OUTPATIENT
Start: 2022-11-29 | End: 2022-11-29

## 2022-11-29 RX ORDER — PREDNISONE 20 MG/1
40 TABLET ORAL DAILY
Qty: 14 TABLET | Refills: 0 | Status: SHIPPED | OUTPATIENT
Start: 2022-11-29 | End: 2022-12-06

## 2022-11-29 RX ORDER — METHOCARBAMOL 500 MG/1
500 TABLET, FILM COATED ORAL ONCE
Status: COMPLETED | OUTPATIENT
Start: 2022-11-29 | End: 2022-11-29

## 2022-11-29 RX ORDER — METHOCARBAMOL 500 MG/1
500 TABLET, FILM COATED ORAL 4 TIMES DAILY PRN
Qty: 30 TABLET | Refills: 0 | Status: SHIPPED | OUTPATIENT
Start: 2022-11-29

## 2022-11-29 RX ORDER — METHYLPREDNISOLONE SOD SUCC 125 MG
125 VIAL (EA) INJECTION ONCE
Status: COMPLETED | OUTPATIENT
Start: 2022-11-29 | End: 2022-11-29

## 2022-11-29 RX ORDER — DOXYCYCLINE 100 MG/1
100 CAPSULE ORAL 2 TIMES DAILY WITH MEALS
Qty: 20 CAPSULE | Refills: 0 | Status: SHIPPED | OUTPATIENT
Start: 2022-11-29 | End: 2022-12-09

## 2022-11-29 RX ORDER — CYCLOBENZAPRINE HCL 10 MG
10 TABLET ORAL 3 TIMES DAILY PRN
Qty: 30 TABLET | Refills: 0 | Status: SHIPPED | OUTPATIENT
Start: 2022-11-29

## 2022-11-29 RX ORDER — IBUPROFEN 600 MG/1
600 TABLET, FILM COATED ORAL EVERY 6 HOURS PRN
Qty: 30 TABLET | Refills: 0 | Status: SHIPPED | OUTPATIENT
Start: 2022-11-29

## 2022-11-29 RX ADMIN — IPRATROPIUM BROMIDE AND ALBUTEROL SULFATE 3 ML: 2.5; .5 SOLUTION RESPIRATORY (INHALATION) at 22:00

## 2022-11-29 RX ADMIN — KETOROLAC TROMETHAMINE 15 MG: 15 INJECTION, SOLUTION INTRAMUSCULAR; INTRAVENOUS at 21:59

## 2022-11-29 RX ADMIN — Medication 125 MG: at 17:56

## 2022-11-29 RX ADMIN — METHOCARBAMOL 500 MG: 500 TABLET ORAL at 23:30

## 2022-11-29 RX ADMIN — IPRATROPIUM BROMIDE AND ALBUTEROL SULFATE 3 ML: 2.5; .5 SOLUTION RESPIRATORY (INHALATION) at 17:53

## 2022-11-29 RX ADMIN — SODIUM CHLORIDE 1000 ML: 9 INJECTION, SOLUTION INTRAVENOUS at 21:44

## 2022-11-29 RX ADMIN — ALBUTEROL SULFATE 2 PUFF: 90 AEROSOL, METERED RESPIRATORY (INHALATION) at 23:30

## 2022-11-29 ASSESSMENT — ENCOUNTER SYMPTOMS
CARDIOVASCULAR NEGATIVE: 1
CHILLS: 0
CHEST TIGHTNESS: 1
SINUS PAIN: 0
SINUS PRESSURE: 0
BACK PAIN: 1
PALPITATIONS: 0
WHEEZING: 1
ARTHRALGIAS: 1
FATIGUE: 0
SHORTNESS OF BREATH: 1
GASTROINTESTINAL NEGATIVE: 1
FEVER: 1
SHORTNESS OF BREATH: 1
SORE THROAT: 0
CHEST TIGHTNESS: 0
BACK PAIN: 1
COUGH: 1
RHINORRHEA: 1

## 2022-11-29 ASSESSMENT — ACTIVITIES OF DAILY LIVING (ADL)
ADLS_ACUITY_SCORE: 33
ADLS_ACUITY_SCORE: 35

## 2022-11-29 ASSESSMENT — ASTHMA QUESTIONNAIRES: ACT_TOTALSCORE: 5

## 2022-11-29 NOTE — PROGRESS NOTES
Wallace Song is a 22 year old, presenting for the following health issues:  Asthma, Back Pain (Lower back pain. Pain is mostly on his left side. ), and Shortness of Breath    HPI   Acute Illness  Acute illness concerns:   Onset/Duration: 2days.  Tested positive for influenza from the ER  Symptoms:  Fever: YES  Chills/Sweats: YES  Headache (location?): No  Sinus Pressure: No  Conjunctivitis:  No  Ear Pain: no  Rhinorrhea: YES  Congestion: YES  Sore Throat: No  Cough: YES-productive of yellow sputum, with shortness of breath  Wheeze: YES  Decreased Appetite: No  Nausea: No  Vomiting: No  Diarrhea: No  Dysuria/Freq.: No  Dysuria or Hematuria: No  Fatigue/Achiness: YES. Back pain  Sick/Strep Exposure: No.  Hx of asthma.    Therapies tried and outcome: prednisone, inhaler with minimal relief    Patient Active Problem List   Diagnosis     Moderate persistent asthma     Esophageal reflux     Chronic allergic rhinitis     Short stature     Vitamin D insufficiency     Asthma exacerbation     Failure to thrive in child     Poor compliance     Non compliance with medical treatment     Health Care Home     Left knee pain     Marijuana abuse     Current Outpatient Medications   Medication     albuterol (PROAIR HFA/PROVENTIL HFA/VENTOLIN HFA) 108 (90 Base) MCG/ACT inhaler     albuterol (PROAIR RESPICLICK) 108 (90 Base) MCG/ACT inhaler     doxycycline monohydrate (MONODOX) 100 MG capsule     ipratropium - albuterol 0.5 mg/2.5 mg/3 mL (DUONEB) 0.5-2.5 (3) MG/3ML neb solution     predniSONE (DELTASONE) 20 MG tablet     Current Facility-Administered Medications   Medication     methylPREDNISolone sodium succinate (solu-MEDROL) injection 125 mg        Allergies   Allergen Reactions     Cats      Rash, itching, cough, nasal congestion         Review of Systems   Constitutional: Positive for fever. Negative for chills and fatigue.   HENT: Positive for congestion and rhinorrhea. Negative for ear discharge, ear pain, hearing  loss, sinus pressure, sinus pain and sore throat.    Respiratory: Positive for cough, shortness of breath and wheezing. Negative for chest tightness.    Cardiovascular: Negative.  Negative for chest pain, palpitations and peripheral edema.   Gastrointestinal: Negative.    Musculoskeletal: Positive for arthralgias and back pain.   All other systems reviewed and are negative.           Objective    /70 (BP Location: Right arm, Patient Position: Sitting, Cuff Size: Adult Regular)   Pulse 96   Temp 98.1  F (36.7  C) (Tympanic)   Ht 1.829 m (6')   Wt 63.5 kg (140 lb)   SpO2 94%   BMI 18.99 kg/m    Body mass index is 18.99 kg/m .  Physical Exam  Vitals and nursing note reviewed.   Constitutional:       General: He is not in acute distress.     Appearance: Normal appearance. He is normal weight. He is ill-appearing.   HENT:      Head: Normocephalic and atraumatic.      Ears:      Comments: TMs are intact without any erythema or bulging bilaterally.  Airway is patent.     Nose: Nose normal.      Mouth/Throat:      Lips: Pink.      Mouth: Mucous membranes are moist.      Pharynx: Oropharynx is clear. Uvula midline. No pharyngeal swelling, oropharyngeal exudate, posterior oropharyngeal erythema or uvula swelling.      Tonsils: No tonsillar exudate or tonsillar abscesses.   Eyes:      General: No scleral icterus.     Conjunctiva/sclera: Conjunctivae normal.      Pupils: Pupils are equal, round, and reactive to light.   Neck:      Thyroid: No thyromegaly.   Cardiovascular:      Rate and Rhythm: Normal rate and regular rhythm.      Pulses: Normal pulses.      Heart sounds: Normal heart sounds, S1 normal and S2 normal. No murmur heard.    No friction rub. No gallop.   Pulmonary:      Effort: Pulmonary effort is normal. No tachypnea, accessory muscle usage, respiratory distress or retractions.      Breath sounds: Normal air entry. No stridor. Examination of the right-middle field reveals wheezing. Examination of the  left-middle field reveals wheezing. Examination of the right-lower field reveals wheezing. Examination of the left-lower field reveals wheezing. Wheezing present. No decreased breath sounds, rhonchi or rales.   Musculoskeletal:      Cervical back: Normal range of motion and neck supple.   Lymphadenopathy:      Cervical: No cervical adenopathy.   Skin:     General: Skin is warm and dry.      Findings: No rash.   Neurological:      Mental Status: He is alert and oriented to person, place, and time.   Psychiatric:         Mood and Affect: Mood normal.         Behavior: Behavior normal.         Thought Content: Thought content normal.         Judgment: Judgment normal.          Assessment/Plan:  Moderate persistent asthma with exacerbation:  Solumedrol and duonebs administered in clinic with some relief.  Will treat with [id number], [id number], and duonebs as needed for symptoms.  Recommend treatment with rest, fluids and chicken soup. Tylenol/ibuprofen prn fever/pain.  Recheck in clinic if symptoms worsen or if symptoms do not improve.  To the ER if he develops hemoptysis, chest pain, fevers>102, worsening shortness of breath/wheezing.    -     ipratropium - albuterol 0.5 mg/2.5 mg/3 mL (DUONEB) 0.5-2.5 (3) MG/3ML neb solution; Take 1 vial (3 mLs) by nebulization every 4 hours as needed for shortness of breath / dyspnea or wheezing  -     predniSONE (DELTASONE) 20 MG tablet; Take 2 tablets (40 mg) by mouth daily for 7 days  -     doxycycline monohydrate (MONODOX) 100 MG capsule; Take 1 capsule (100 mg) by mouth 2 times daily (with meals) for 10 days Increases risk of heartburn and also sun sensitivity or sun burn. Contraindicated in pregnancy.    Influenza A:  Recommend that they fill the tamiflu RX from the hospital.  -     ipratropium - albuterol 0.5 mg/2.5 mg/3 mL (DUONEB) neb solution 3 mL  -     methylPREDNISolone sodium succinate (solu-MEDROL) injection 125 mg    Chronic left-sided low back pain without  sciatica:  Will refill his ibuprofen and flexeril.  -     cyclobenzaprine (FLEXERIL) 10 MG tablet; Take 1 tablet (10 mg) by mouth 3 times daily as needed for muscle spasms  -     ibuprofen (ADVIL/MOTRIN) 600 MG tablet; Take 1 tablet (600 mg) by mouth every 6 hours as needed for moderate pain (4-6)        Vivian Aguilera PA-C

## 2022-11-30 ENCOUNTER — TELEPHONE (OUTPATIENT)
Dept: URGENT CARE | Facility: URGENT CARE | Age: 22
End: 2022-11-30

## 2022-11-30 ENCOUNTER — OFFICE VISIT (OUTPATIENT)
Dept: URGENT CARE | Facility: URGENT CARE | Age: 22
End: 2022-11-30
Payer: COMMERCIAL

## 2022-11-30 VITALS
HEART RATE: 60 BPM | SYSTOLIC BLOOD PRESSURE: 121 MMHG | WEIGHT: 121.2 LBS | OXYGEN SATURATION: 97 % | DIASTOLIC BLOOD PRESSURE: 78 MMHG | BODY MASS INDEX: 16.44 KG/M2 | TEMPERATURE: 97.9 F

## 2022-11-30 DIAGNOSIS — J45.41 MODERATE PERSISTENT ASTHMA WITH ACUTE EXACERBATION: Primary | ICD-10-CM

## 2022-11-30 DIAGNOSIS — J10.1 INFLUENZA A: ICD-10-CM

## 2022-11-30 PROCEDURE — 99285 EMERGENCY DEPT VISIT HI MDM: CPT | Performed by: EMERGENCY MEDICINE

## 2022-11-30 PROCEDURE — 99215 OFFICE O/P EST HI 40 MIN: CPT | Mod: 25 | Performed by: PHYSICIAN ASSISTANT

## 2022-11-30 PROCEDURE — 94640 AIRWAY INHALATION TREATMENT: CPT | Performed by: PHYSICIAN ASSISTANT

## 2022-11-30 RX ORDER — ONDANSETRON 4 MG/1
8 TABLET, ORALLY DISINTEGRATING ORAL
COMMUNITY
Start: 2022-11-28

## 2022-11-30 RX ORDER — IPRATROPIUM BROMIDE AND ALBUTEROL SULFATE 2.5; .5 MG/3ML; MG/3ML
3 SOLUTION RESPIRATORY (INHALATION) ONCE
Status: COMPLETED | OUTPATIENT
Start: 2022-11-30 | End: 2022-11-30

## 2022-11-30 RX ORDER — OSELTAMIVIR PHOSPHATE 75 MG/1
75 CAPSULE ORAL
COMMUNITY
Start: 2022-11-28 | End: 2022-12-03

## 2022-11-30 RX ADMIN — IPRATROPIUM BROMIDE AND ALBUTEROL SULFATE 3 ML: 2.5; .5 SOLUTION RESPIRATORY (INHALATION) at 15:29

## 2022-11-30 ASSESSMENT — ENCOUNTER SYMPTOMS
NEUROLOGICAL NEGATIVE: 1
MYALGIAS: 0
ABDOMINAL PAIN: 0
MUSCULOSKELETAL NEGATIVE: 1
SORE THROAT: 0
FREQUENCY: 0
PALPITATIONS: 0
NAUSEA: 0
HEMATURIA: 0
ALLERGIC/IMMUNOLOGIC NEGATIVE: 1
CARDIOVASCULAR NEGATIVE: 1
GASTROINTESTINAL NEGATIVE: 1
CONSTITUTIONAL NEGATIVE: 1
HEADACHES: 0
WHEEZING: 1
CHILLS: 0
VOMITING: 0
DIARRHEA: 0
FEVER: 0
COUGH: 0
SHORTNESS OF BREATH: 1
CHEST TIGHTNESS: 0
DYSURIA: 0

## 2022-11-30 NOTE — PROGRESS NOTES
Chief Complaint:     Chief Complaint   Patient presents with     Breathing Problem     Requesting nebulizer     Nausea       No results found for any visits on 11/30/22.    Medical Decision Making:    Vital signs reviewed by Iain Rodriguez PA-C  /78   Pulse 60   Temp 97.9  F (36.6  C) (Tympanic)   Wt 55 kg (121 lb 3.2 oz)   SpO2 97%   BMI 16.44 kg/m      Differential Diagnosis:  URI Adult/Peds:  Asthma exacerbation, Influenza and Pneumonia        ASSESSMENT    1. Moderate persistent asthma with acute exacerbation    2. Influenza A        PLAN    Patient appears toxic in clinic today.    Temp is 97.9 in clinic today, lung sounds have wheezing and rhonchi throughout, and O2 sats at 90% on RA.    Patient given Neb treatment with Duoneb in clinic today.  O2 sats at 97% on RA post treatment.  Patient given Neb Compressor for home use.  Continue taking Tamiflu as directed.  Rest, Push fluids, vaporizer, elevation of head of bed.  Ibuprofen and or Tylenol for any fever or body aches.  Over the counter cough suppressant- PRN- as discussed.   If symptoms worsen, recheck immediately otherwise follow up with your PCP in 1 week if symptoms are not improving.  Worrisome symptoms discussed with instructions to go to the ED.  Patient verbalized understanding and agreed with this plan.  47 minutes was spent in the care of this patient including chart review, HPI, ROS, PE, review of plan, and placing of orders.      Labs:    No results found for any visits on 11/30/22.     Vital signs reviewed by Iain Rodriguez PA-C  /78   Pulse 60   Temp 97.9  F (36.6  C) (Tympanic)   Wt 55 kg (121 lb 3.2 oz)   SpO2 97%   BMI 16.44 kg/m      Current Meds      Current Outpatient Medications:      albuterol (PROAIR HFA/PROVENTIL HFA/VENTOLIN HFA) 108 (90 Base) MCG/ACT inhaler, Inhale 1-2 puffs into the lungs every 6 hours, Disp: 18 g, Rfl: 1     albuterol (PROAIR RESPICLICK) 108 (90 Base) MCG/ACT inhaler, Inhale 1-2 puffs  into the lungs every 4 hours as needed for shortness of breath / dyspnea or wheezing, Disp: 1 Inhaler, Rfl: 0     cyclobenzaprine (FLEXERIL) 10 MG tablet, Take 1 tablet (10 mg) by mouth 3 times daily as needed for muscle spasms, Disp: 30 tablet, Rfl: 0     doxycycline monohydrate (MONODOX) 100 MG capsule, Take 1 capsule (100 mg) by mouth 2 times daily (with meals) for 10 days Increases risk of heartburn and also sun sensitivity or sun burn. Contraindicated in pregnancy., Disp: 20 capsule, Rfl: 0     ipratropium - albuterol 0.5 mg/2.5 mg/3 mL (DUONEB) 0.5-2.5 (3) MG/3ML neb solution, Take 1 vial (3 mLs) by nebulization every 4 hours as needed for shortness of breath / dyspnea or wheezing, Disp: 90 mL, Rfl: 0     ondansetron (ZOFRAN ODT) 4 MG ODT tab, Place 8 mg under the tongue, Disp: , Rfl:      oseltamivir (TAMIFLU) 75 MG capsule, Take 75 mg by mouth, Disp: , Rfl:      predniSONE (DELTASONE) 20 MG tablet, Take 2 tablets (40 mg) by mouth daily for 7 days, Disp: 14 tablet, Rfl: 0     ibuprofen (ADVIL/MOTRIN) 600 MG tablet, Take 1 tablet (600 mg) by mouth every 6 hours as needed for moderate pain (4-6) (Patient not taking: Reported on 11/30/2022), Disp: 30 tablet, Rfl: 0     methocarbamol (ROBAXIN) 500 MG tablet, Take 1 tablet (500 mg) by mouth 4 times daily as needed for muscle spasms (back pain) (Patient not taking: Reported on 11/30/2022), Disp: 30 tablet, Rfl: 0  No current facility-administered medications for this visit.      Respiratory History    occasional episodes of bronchitis and asthma      SUBJECTIVE    HPI: Duncan Dumont is an 22 year old male who presents with ongoing SOB, and wheezing.  Patient was Dx with Influenza on 11/28.  He did not start taking the medication until today.  Patient was seen yesterday for same.  He was treated with neb treatment, steroids, with Rx for Prednisone, Duoneb and antibiotic sent in.  He has not started his medications. Patient was then seen in the ED last night.   CXR was negative.  Patient is here for neb treatment. He denies any new or worsening symptoms.      Patient denies any recent travel or exposure to known COVID positive tested individual.      ROS:     Review of Systems   Constitutional: Negative.  Negative for chills and fever.   HENT: Negative.  Negative for sore throat.    Respiratory: Positive for shortness of breath and wheezing. Negative for cough and chest tightness.    Cardiovascular: Negative.  Negative for chest pain and palpitations.   Gastrointestinal: Negative.  Negative for abdominal pain, diarrhea, nausea and vomiting.   Genitourinary: Negative for dysuria, frequency, hematuria and urgency.   Musculoskeletal: Negative.  Negative for myalgias.   Skin: Negative for rash.   Allergic/Immunologic: Negative.  Negative for immunocompromised state.   Neurological: Negative.  Negative for headaches.         Family History   Family History   Problem Relation Age of Onset     Asthma Mother      Allergies Mother         Problem history  Patient Active Problem List   Diagnosis     Moderate persistent asthma     Esophageal reflux     Chronic allergic rhinitis     Short stature     Vitamin D insufficiency     Asthma exacerbation     Failure to thrive in child     Poor compliance     Non compliance with medical treatment     Health Care Home     Left knee pain     Marijuana abuse        Allergies  Allergies   Allergen Reactions     Cats      Rash, itching, cough, nasal congestion          Social History  Social History     Socioeconomic History     Marital status: Single     Spouse name: Not on file     Number of children: Not on file     Years of education: Not on file     Highest education level: Not on file   Occupational History     Not on file   Tobacco Use     Smoking status: Never     Smokeless tobacco: Never     Tobacco comments:     mom quit   Substance and Sexual Activity     Alcohol use: No     Alcohol/week: 0.0 standard drinks     Drug use: Yes      Comment: denies     Sexual activity: Yes     Partners: Female   Other Topics Concern     Not on file   Social History Narrative    FAMILY INFORMATION     Date: 2008    Parent #1      Name: Devaughn Felipe   Gender: female   : 1980      Education: high school   Occupation:         Parent #2      Name: 000   Gender: male   : 000     Education: 000   Occupation: 000        Siblings:  Kwan Dumont sister 02/15/2008        Relationship Status of Parent(s): single    Who does the child live with? Mother and sib    What language(s) is/are spoken at home? English                 Social Determinants of Health     Financial Resource Strain: Not on file   Food Insecurity: Not on file   Transportation Needs: Not on file   Physical Activity: Not on file   Stress: Not on file   Social Connections: Not on file   Intimate Partner Violence: Not on file   Housing Stability: Not on file        OBJECTIVE     Vital signs reviewed by Iain Rodriguez PA-C  /78   Pulse 60   Temp 97.9  F (36.6  C) (Tympanic)   Wt 55 kg (121 lb 3.2 oz)   SpO2 97%   BMI 16.44 kg/m       Physical Exam  Vitals reviewed.   Constitutional:       General: He is not in acute distress.     Appearance: He is well-developed. He is toxic-appearing. He is not ill-appearing or diaphoretic.   HENT:      Head: Normocephalic and atraumatic.      Right Ear: Hearing, tympanic membrane, ear canal and external ear normal. No drainage, swelling or tenderness. Tympanic membrane is not perforated, erythematous, retracted or bulging.      Left Ear: Hearing, tympanic membrane, ear canal and external ear normal. No drainage, swelling or tenderness. Tympanic membrane is not perforated, erythematous, retracted or bulging.      Nose: Congestion present. No nasal tenderness, mucosal edema or rhinorrhea.      Right Turbinates: Not enlarged or swollen.      Left Turbinates: Not enlarged or swollen.      Right Sinus: No maxillary sinus  tenderness or frontal sinus tenderness.      Left Sinus: No maxillary sinus tenderness or frontal sinus tenderness.      Mouth/Throat:      Pharynx: No pharyngeal swelling, oropharyngeal exudate, posterior oropharyngeal erythema or uvula swelling.      Tonsils: No tonsillar exudate. 0 on the right. 0 on the left.   Eyes:      General: Lids are normal.         Right eye: No discharge.         Left eye: No discharge.      Conjunctiva/sclera: Conjunctivae normal.      Right eye: Right conjunctiva is not injected. No exudate.     Left eye: Left conjunctiva is not injected. No exudate.     Pupils: Pupils are equal, round, and reactive to light.   Cardiovascular:      Rate and Rhythm: Normal rate and regular rhythm.      Heart sounds: Normal heart sounds. No murmur heard.    No friction rub. No gallop.   Pulmonary:      Effort: Pulmonary effort is normal. No accessory muscle usage, respiratory distress or retractions.      Breath sounds: Normal air entry. No stridor, decreased air movement or transmitted upper airway sounds. Wheezing and rhonchi present. No decreased breath sounds or rales.   Chest:      Chest wall: No tenderness.   Abdominal:      General: Bowel sounds are normal. There is no distension.      Palpations: Abdomen is soft. Abdomen is not rigid. There is no mass.      Tenderness: There is no abdominal tenderness. There is no guarding or rebound.   Musculoskeletal:         General: Normal range of motion.      Cervical back: Normal range of motion and neck supple.   Lymphadenopathy:      Head:      Right side of head: No submental, submandibular, tonsillar, preauricular or posterior auricular adenopathy.      Left side of head: No submental, submandibular, tonsillar, preauricular or posterior auricular adenopathy.      Cervical:      Right cervical: No superficial or posterior cervical adenopathy.     Left cervical: No superficial or posterior cervical adenopathy.   Skin:     General: Skin is warm.       Capillary Refill: Capillary refill takes less than 2 seconds.   Neurological:      Mental Status: He is alert and oriented to person, place, and time.      Cranial Nerves: No cranial nerve deficit.      Sensory: No sensory deficit.      Motor: No abnormal muscle tone.      Coordination: Coordination normal.      Deep Tendon Reflexes: Reflexes normal.   Psychiatric:         Behavior: Behavior normal. Behavior is cooperative.         Thought Content: Thought content normal.         Judgment: Judgment normal.           Iain Rodriguez PA-C  11/30/2022, 3:13 PM

## 2022-11-30 NOTE — DISCHARGE INSTRUCTIONS
Thank you for coming to the [facility] Emergency Department.     Please fill all the medications prescribed at your clinic appointment today.   For back pain, try the muscle relaxer Robaxan (methylcarbamol) if cyclobenzaprine (flexeril) is not helping. OK to continue ibuprofen as well.

## 2022-11-30 NOTE — TELEPHONE ENCOUNTER
"Patient's girlfriend calling in (patient with girlfriend on speaker phone, verbal CTC given) to explain that they went to the pharmacy today to  his Prednisone 20 mg prescription ordered by [facility]  provider yesterday, however pharmacy stated that they would not be able to give this to patient until 12/6/22.     Patient and girlfriend state that patient needs this medication, as he was diagnosed with the flu and has asthma, and he is \"out\" of this medication. Patient stated that they went to the ED on 11/28/22 and was prescribed Prednisone 20 mg there as well, to take for 4 days, but patient states that they are out of this medication. Patient states that he was told to take 2 tabs of this medication daily but also take it as needed every 3-4 hours if his inhaler wasn't helping his breathing. Writer notes that the additional \"as needed\" instructions are not written anywhere in AVS on 11/28/222.     Writer called patient's pharmacy to explain situation, pharmacist stated that patient's insurance will only cover this medication to be started on 12/2/22, as was written for previous prescription to last 4 days.     Writer relayed above information to patient and girlfriend, both verbalized understanding. No further questions or concerns at this time.      YO Jackson, RN  [facility]                    "

## 2022-11-30 NOTE — TELEPHONE ENCOUNTER
Patient and his girlfriend called back requesting further assistance with the prednisone prescription. Patient is requesting a refill and when he spoke with pharmacy they stated that the provider would need to call patient's insurance to approve a second fill of the medication. RN notes no PCP within [facility]. RN advised that patient would have to return to  to be seen for any further prescriptions. Patient and girlfriend verbalized understanding.     RN advised that patient set up an establishment of care visit with a provider at the [facility] as his most current PCP listed is a pediatrician. Patient's girlfriend and patient verbalized understanding.     Karina Pretty RN    [facility]

## 2022-11-30 NOTE — ED TRIAGE NOTES
Pt diagnosed with the flu yesterday at [facility], feeling worse today, pt has hx of chronic back pain that has worsened today, pt was given a steroid shot at the clinic today for asthma       Triage Assessment     Row Name 11/29/22 2013       Triage Assessment (Adult)    Airway WDL WDL       Respiratory WDL    Respiratory WDL WDL       Skin Circulation/Temperature WDL    Skin Circulation/Temperature WDL WDL       Cardiac WDL    Cardiac WDL WDL       Peripheral/Neurovascular WDL    Peripheral Neurovascular WDL WDL       Cognitive/Neuro/Behavioral WDL    Cognitive/Neuro/Behavioral WDL WDL

## 2022-11-30 NOTE — ED PROVIDER NOTES
.    [facility] EMERGENCY DEPARTMENT ([facility])    22      ED Provider Note  [facility]      History     Chief Complaint   Patient presents with     Generalized Body Aches     Pt diagnosed with the flu yesterday at [facility], feeling worse today, pt has hx of chronic back pain that has worsened today, pt was given a steroid shot at the clinic today for asthma     The history is provided by the patient and medical records.     Duncan Dumont is a 22 year old male with a past medical history of asthma. He was diagnosed yesterday with influenza and got sent here after a clinic visit today because his asthma was flared up.  He was given a shot of steroids and prescribed a course of doxycycline but he has not begun this yet because his meds were sent to a pharmacy that was closed.  He reports that since the clinic visit he was more short of breath and feeling tighter when he tried to breathe and so came here to the ED.  In addition, he has been complaining of left thoracic back pain that is chronic, has been present for several years. He does not have a new injury to his back.        This part of the medical record was transcribed by Leeann Barillas, Medical Scribe, from a dictation done by Judith Posey MD.       Past Medical History  Past Medical History:   Diagnosis Date     Asthma exacerbation 10/11/2013    Hospitalized 10/11-10/14/13     Asthma exacerbation     Hospitalized -8/25/15 - PICU     Asthma exacerbation     Hospitalized 16 - PICU     Constipation     required enema in      Moderate persistent asthma 2008    Hospitalized -08 at [facility] with acute exacerbation.       Moderate persistent asthma 2008    Hospitalized -2009     Moderate persistent asthma     Hospitalized 2/     Past Surgical History:   Procedure Laterality Date     CIRCUMCISION,OTHER,<28 D/O      as      methocarbamol (ROBAXIN) 500 MG  tablet  albuterol (PROAIR HFA/PROVENTIL HFA/VENTOLIN HFA) 108 (90 Base) MCG/ACT inhaler  albuterol (PROAIR RESPICLICK) 108 (90 Base) MCG/ACT inhaler  cyclobenzaprine (FLEXERIL) 10 MG tablet  doxycycline monohydrate (MONODOX) 100 MG capsule  fluticasone (FLOVENT HFA) 44 MCG/ACT inhaler  ibuprofen (ADVIL/MOTRIN) 600 MG tablet  ipratropium - albuterol 0.5 mg/2.5 mg/3 mL (DUONEB) 0.5-2.5 (3) MG/3ML neb solution  ondansetron (ZOFRAN ODT) 4 MG ODT tab  oseltamivir (TAMIFLU) 75 MG capsule  predniSONE (DELTASONE) 20 MG tablet      Allergies   Allergen Reactions     Cats      Rash, itching, cough, nasal congestion       Family History  Family History   Problem Relation Age of Onset     Asthma Mother      Allergies Mother      Social History   Social History     Tobacco Use     Smoking status: Every Day     Types: Vaping Device     Smokeless tobacco: Never     Tobacco comments:     mom quit   Substance Use Topics     Alcohol use: No     Alcohol/week: 0.0 standard drinks     Drug use: Yes     Comment: denies      Past medical history, past surgical history, medications, allergies, family history, and social history were reviewed with the patient. No additional pertinent items.       Review of Systems   Respiratory: Positive for chest tightness and shortness of breath.    Musculoskeletal: Positive for back pain.        L thoracic back pain     A complete review of systems was performed with pertinent positives and negatives noted in the HPI, and all other systems negative.    Physical Exam   BP: 113/76  Pulse: 89  Temp: 99.3  F (37.4  C)  Resp: 20  SpO2: 91 %     Physical Exam  Gen:A&Ox3, no acute distress, speaking in full sentences  HEENT:PERRL, no facial tenderness or wounds, head atraumatic, oropharynx clear, mucous membranes moist, TMs clear bilaterally  Neck:no bony tenderness or step offs, no JVD, trachea midline  Back: no CVA tenderness, no midline bony tenderness. Left thoracic perispinal muscle tenderness and  tightness at trapezius  CV:RRR without murmurs  PULM: Scant wheeze, tight, work of breathing is not labored. No use of accessory muscles of respiration  Abd:soft, nontender, nondistended. Bowel sounds present and normal  UE:No traumatic injuries, skin normal  LE:no traumatic injuries, skin normal, no LE edema or calf tenderness   Neuro:CN II-XII intact, strength 5/5 throughout, gait stable.   Skin: no rashes or ecchymoses  ED Course      Procedures         Results for orders placed or performed during the hospital encounter of 11/29/22   Chest XR,  PA & LAT     Status: None    Narrative    EXAM: XR CHEST 2 VIEWS  LOCATION: [facility]  DATE/TIME: 11/29/2022 9:59 PM    INDICATION: cough and wheezing  COMPARISON: 8/22/1950      Impression    IMPRESSION: Negative chest.   Basic metabolic panel     Status: Abnormal   Result Value Ref Range    Sodium 139 133 - 144 mmol/L    Potassium 4.3 3.4 - 5.3 mmol/L    Chloride 101 94 - 109 mmol/L    Carbon Dioxide (CO2) 31 20 - 32 mmol/L    Anion Gap 7 3 - 14 mmol/L    Urea Nitrogen 19 7 - 30 mg/dL    Creatinine 1.14 0.66 - 1.25 mg/dL    Calcium 9.2 8.5 - 10.1 mg/dL    Glucose 123 (H) 70 - 99 mg/dL    GFR Estimate >90 >60 mL/min/1.73m2   CBC with platelets and differential     Status: Abnormal   Result Value Ref Range    WBC Count 11.5 (H) 4.0 - 11.0 10e3/uL    RBC Count 4.90 4.40 - 5.90 10e6/uL    Hemoglobin 14.1 13.3 - 17.7 g/dL    Hematocrit 41.5 40.0 - 53.0 %    MCV 85 78 - 100 fL    MCH 28.8 26.5 - 33.0 pg    MCHC 34.0 31.5 - 36.5 g/dL    RDW 13.2 10.0 - 15.0 %    Platelet Count 223 150 - 450 10e3/uL    % Neutrophils 90 %    % Lymphocytes 5 %    % Monocytes 5 %    % Eosinophils 0 %    % Basophils 0 %    % Immature Granulocytes 0 %    NRBCs per 100 WBC 0 <1 /100    Absolute Neutrophils 10.4 (H) 1.6 - 8.3 10e3/uL    Absolute Lymphocytes 0.6 (L) 0.8 - 5.3 10e3/uL    Absolute Monocytes 0.6 0.0 - 1.3 10e3/uL    Absolute Eosinophils 0.0  0.0 - 0.7 10e3/uL    Absolute Basophils 0.0 0.0 - 0.2 10e3/uL    Absolute Immature Granulocytes 0.0 <=0.4 10e3/uL    Absolute NRBCs 0.0 10e3/uL   CBC with platelets differential     Status: Abnormal    Narrative    The following orders were created for panel order CBC with platelets differential.  Procedure                               Abnormality         Status                     ---------                               -----------         ------                     CBC with platelets and d...[[id number]]  Abnormal            Final result                 Please view results for these tests on the individual orders.     Medications   ipratropium - albuterol 0.5 mg/2.5 mg/3 mL (DUONEB) neb solution 3 mL (3 mLs Nebulization Given 11/29/22 2200)   0.9% sodium chloride BOLUS (0 mLs Intravenous Stopped 11/29/22 2244)   ketorolac (TORADOL) injection 15 mg (15 mg Intravenous Given 11/29/22 2159)   albuterol (PROVENTIL HFA/VENTOLIN HFA) inhaler (2 puffs Inhalation Given 11/29/22 2330)   methocarbamol (ROBAXIN) tablet 500 mg (500 mg Oral Given 11/29/22 2330)        Assessments & Plan (with Medical Decision Making)   22-year-old male presenting with asthma exacerbation in setting of influenza infection, symptomatic.  Has received steroids earlier today, given DuoNeb.     IV access obtained, laboratory testing done. He was treated with 1L 0.9NS and Toradol for pain. Given duoneb for wheezing and robaxin for muscle spasm.     Chest x-ray shows no infiltrates or pneumothorax.     O2 sat on RA improved after nebs. Pt reported feeling improved.   Given robaxin for muscle pain at home and a new albuterol inhaler to use q4 PRN.   Instructed to  prescriptions as written at clinic today, including steroids.   Discharged.     I have reviewed the nursing notes. I have reviewed the findings, diagnosis, plan and need for follow up with the patient.    This part of the medical record was transcribed by Donna Moyer  Luis Alberto, from a dictation done by Judith Posey MD.       Discharge Medication List as of 11/29/2022 11:10 PM      START taking these medications    Details   methocarbamol (ROBAXIN) 500 MG tablet Take 1 tablet (500 mg) by mouth 4 times daily as needed for muscle spasms (back pain), Disp-30 tablet, R-0, Local Print             Final diagnoses:   Influenza A   Moderate persistent asthma with exacerbation       --  Judith Posey MD.  [facility] EMERGENCY DEPARTMENT  11/29/2022     Judith Posey MD  12/03/22 0707

## 2022-11-30 NOTE — NURSING NOTE
Clinic Administered Medication Documentation    Administrations This Visit     ipratropium - albuterol 0.5 mg/2.5 mg/3 mL (DUONEB) neb solution 3 mL     Admin Date  11/30/2022 Action  Given Dose  3 mL Route  Nebulization Site   Administered By  Toby Amador    Ordering Provider: Iain Rodriguez PA-C    Patient Supplied?: No                Inhalable/Nebs Medication Documentation    Patient was given Ipratropium-Albuterol Neb. Prior to medication administration, verified patients identity using patient s name and date of birth. Please see MAR and medication order for additional information.     Expiration Date:  11/2023

## 2022-12-01 ENCOUNTER — HOSPITAL ENCOUNTER (OUTPATIENT)
Facility: CLINIC | Age: 22
Setting detail: OBSERVATION
Discharge: HOME | End: 2022-12-01
Attending: EMERGENCY MEDICINE | Admitting: EMERGENCY MEDICINE
Payer: COMMERCIAL

## 2022-12-01 VITALS
TEMPERATURE: 98.6 F | HEART RATE: 51 BPM | DIASTOLIC BLOOD PRESSURE: 70 MMHG | RESPIRATION RATE: 16 BRPM | OXYGEN SATURATION: 99 % | SYSTOLIC BLOOD PRESSURE: 104 MMHG

## 2022-12-01 DIAGNOSIS — F17.290 OTHER TOBACCO PRODUCT NICOTINE DEPENDENCE, UNCOMPLICATED: ICD-10-CM

## 2022-12-01 DIAGNOSIS — Z20.822 CONTACT WITH AND (SUSPECTED) EXPOSURE TO COVID-19: ICD-10-CM

## 2022-12-01 DIAGNOSIS — J45.901 EXACERBATION OF ASTHMA, UNSPECIFIED ASTHMA SEVERITY, UNSPECIFIED WHETHER PERSISTENT: ICD-10-CM

## 2022-12-01 DIAGNOSIS — J10.1 INFLUENZA A: ICD-10-CM

## 2022-12-01 LAB
FLUAV RNA SPEC QL NAA+PROBE: POSITIVE
FLUBV RNA RESP QL NAA+PROBE: NEGATIVE
RSV RNA SPEC NAA+PROBE: NEGATIVE
SARS-COV-2 RNA RESP QL NAA+PROBE: NEGATIVE

## 2022-12-01 PROCEDURE — 250N000013 HC RX MED GY IP 250 OP 250 PS 637: Performed by: EMERGENCY MEDICINE

## 2022-12-01 PROCEDURE — G0378 HOSPITAL OBSERVATION PER HR: HCPCS

## 2022-12-01 PROCEDURE — 999N000126 HC STATISTIC PEAK FLOW MEASUREMENT

## 2022-12-01 PROCEDURE — 94640 AIRWAY INHALATION TREATMENT: CPT | Performed by: EMERGENCY MEDICINE

## 2022-12-01 PROCEDURE — 250N000011 HC RX IP 250 OP 636: Performed by: EMERGENCY MEDICINE

## 2022-12-01 PROCEDURE — 250N000009 HC RX 250: Performed by: EMERGENCY MEDICINE

## 2022-12-01 PROCEDURE — 87637 SARSCOV2&INF A&B&RSV AMP PRB: CPT | Performed by: EMERGENCY MEDICINE

## 2022-12-01 PROCEDURE — 99218 PR INITIAL OBSERVATION CARE,LEVEL I: CPT | Performed by: NURSE PRACTITIONER

## 2022-12-01 PROCEDURE — 250N000012 HC RX MED GY IP 250 OP 636 PS 637: Performed by: PHYSICIAN ASSISTANT

## 2022-12-01 PROCEDURE — 999N000157 HC STATISTIC RCP TIME EA 10 MIN

## 2022-12-01 PROCEDURE — C9803 HOPD COVID-19 SPEC COLLECT: HCPCS | Performed by: EMERGENCY MEDICINE

## 2022-12-01 PROCEDURE — 250N000009 HC RX 250: Performed by: PHYSICIAN ASSISTANT

## 2022-12-01 RX ORDER — DEXAMETHASONE 2 MG/1
6 TABLET ORAL ONCE
Status: COMPLETED | OUTPATIENT
Start: 2022-12-01 | End: 2022-12-01

## 2022-12-01 RX ORDER — OSELTAMIVIR PHOSPHATE 75 MG/1
75 CAPSULE ORAL 2 TIMES DAILY
Status: DISCONTINUED | OUTPATIENT
Start: 2022-12-01 | End: 2022-12-01 | Stop reason: HOSPADM

## 2022-12-01 RX ORDER — IPRATROPIUM BROMIDE AND ALBUTEROL SULFATE 2.5; .5 MG/3ML; MG/3ML
3 SOLUTION RESPIRATORY (INHALATION) EVERY 4 HOURS PRN
Status: DISCONTINUED | OUTPATIENT
Start: 2022-12-01 | End: 2022-12-01

## 2022-12-01 RX ORDER — ACETAMINOPHEN 325 MG/1
650 TABLET ORAL EVERY 4 HOURS PRN
Status: DISCONTINUED | OUTPATIENT
Start: 2022-12-01 | End: 2022-12-01 | Stop reason: HOSPADM

## 2022-12-01 RX ORDER — ALBUTEROL SULFATE 5 MG/ML
2.5 SOLUTION RESPIRATORY (INHALATION)
Status: DISCONTINUED | OUTPATIENT
Start: 2022-12-01 | End: 2022-12-01

## 2022-12-01 RX ORDER — ALBUTEROL SULFATE 0.83 MG/ML
2.5 SOLUTION RESPIRATORY (INHALATION) EVERY 4 HOURS PRN
Status: DISCONTINUED | OUTPATIENT
Start: 2022-12-01 | End: 2022-12-01 | Stop reason: HOSPADM

## 2022-12-01 RX ORDER — OSELTAMIVIR PHOSPHATE 75 MG/1
75 CAPSULE ORAL DAILY
Status: DISCONTINUED | OUTPATIENT
Start: 2022-12-01 | End: 2022-12-01

## 2022-12-01 RX ORDER — IPRATROPIUM BROMIDE AND ALBUTEROL SULFATE 2.5; .5 MG/3ML; MG/3ML
3 SOLUTION RESPIRATORY (INHALATION) ONCE
Status: COMPLETED | OUTPATIENT
Start: 2022-12-01 | End: 2022-12-01

## 2022-12-01 RX ORDER — IPRATROPIUM BROMIDE AND ALBUTEROL SULFATE 2.5; .5 MG/3ML; MG/3ML
3 SOLUTION RESPIRATORY (INHALATION)
Status: DISCONTINUED | OUTPATIENT
Start: 2022-12-01 | End: 2022-12-01 | Stop reason: HOSPADM

## 2022-12-01 RX ORDER — FLUTICASONE PROPIONATE 44 UG/1
1 AEROSOL, METERED RESPIRATORY (INHALATION) 2 TIMES DAILY
Qty: 10.6 G | Refills: 0 | Status: SHIPPED | OUTPATIENT
Start: 2022-12-01

## 2022-12-01 RX ORDER — IBUPROFEN 600 MG/1
600 TABLET, FILM COATED ORAL EVERY 6 HOURS PRN
Status: DISCONTINUED | OUTPATIENT
Start: 2022-12-01 | End: 2022-12-01 | Stop reason: HOSPADM

## 2022-12-01 RX ORDER — PREDNISONE 20 MG/1
40 TABLET ORAL DAILY
Status: DISCONTINUED | OUTPATIENT
Start: 2022-12-01 | End: 2022-12-01 | Stop reason: HOSPADM

## 2022-12-01 RX ORDER — IPRATROPIUM BROMIDE AND ALBUTEROL SULFATE 2.5; .5 MG/3ML; MG/3ML
3 SOLUTION RESPIRATORY (INHALATION) ONCE
Status: DISCONTINUED | OUTPATIENT
Start: 2022-12-01 | End: 2022-12-01

## 2022-12-01 RX ORDER — LANOLIN ALCOHOL/MO/W.PET/CERES
3 CREAM (GRAM) TOPICAL
Status: DISCONTINUED | OUTPATIENT
Start: 2022-12-01 | End: 2022-12-01 | Stop reason: HOSPADM

## 2022-12-01 RX ORDER — FLUTICASONE PROPIONATE 44 UG/1
1 AEROSOL, METERED RESPIRATORY (INHALATION) 2 TIMES DAILY
Qty: 10.6 G | Refills: 0 | Status: SHIPPED | OUTPATIENT
Start: 2022-12-01 | End: 2022-12-01

## 2022-12-01 RX ADMIN — PREDNISONE 40 MG: 20 TABLET ORAL at 09:37

## 2022-12-01 RX ADMIN — IPRATROPIUM BROMIDE AND ALBUTEROL SULFATE 3 ML: 2.5; .5 SOLUTION RESPIRATORY (INHALATION) at 01:51

## 2022-12-01 RX ADMIN — IPRATROPIUM BROMIDE AND ALBUTEROL SULFATE 3 ML: 2.5; .5 SOLUTION RESPIRATORY (INHALATION) at 09:37

## 2022-12-01 RX ADMIN — DEXAMETHASONE 6 MG: 2 TABLET ORAL at 01:47

## 2022-12-01 RX ADMIN — OSELTAMIVIR PHOSPHATE 75 MG: 75 CAPSULE ORAL at 09:37

## 2022-12-01 ASSESSMENT — ACTIVITIES OF DAILY LIVING (ADL)
ADLS_ACUITY_SCORE: 35

## 2022-12-01 NOTE — LETTER
December 1, 2022      To Whom It May Concern:      Duncan Dumont was seen in our Emergency Department today, 12/01/22.  I expect his condition to improve over the next 7 days.  He may return to work/school when improved.    Sincerely,        Jaz Sadler RN

## 2022-12-01 NOTE — ED NOTES
Pt made comment that he is feeling better doesn't want to stay in the hospital and just needs an Rx for steroids at home as he already has Rx for Tamiflu.

## 2022-12-01 NOTE — ED NOTES
The patient was accepted at shift change signout with a plan to be admitted to ED observation.  However, the patient now states he is feeling much improved and would prefer to discharge home.  O2 sats have been low previously, are now in the upper 90s on room air.  He had been given dose of Decadron here, which likely was very helpful.  He is given the prescription for Flovent that was previously recommended, does have a prescription for steroids that he can fill tomorrow.  He also has Tamiflu at home.  He is encouraged to continue these medications, return with any worsening or concerns.  Is encouraged to follow-up, isolate.  He verbalizes understanding and is agreeable to the plan.     Orly Hdz MD  12/01/22 [id number]

## 2022-12-01 NOTE — H&P
"[facility]    History and Physical - Emergency Department Observation Unit     Date of Admission:  12/1/2022    Assessment & Plan      Duncan Dumont is a 22 year old male admitted on 12/1/2022. He has a history of moderate persistent asthma, medical treatment nonadherence, GERD, recent influenza A diagnosis on 11/28/2022 who presents to the ED with shortness of breath.      # Asthma exacerbation  # Influenza A positive on 11/28/22  Patient presented to the ED on 11/28 and 11//29. He reports feeling improved for a short while. He reports he was unable to fill prednisone due to insurance. He was not able to fill it until 12/2/22.  He also endorses nausea but has had no vomiting, no diarrhea.  Minimal abdominal discomfort.  Minimal body aches, no fever.  Plan:   -Prednisone 40 mg p.o. twice daily for 5 days  -Bronchodilator therapy with scheduled duo nebs 4 times daily, as needed  nebulized albuterol  -Daily peak flow measurements  -Supplemental oxygen as needed, wean off as able      Diet: Regular Diet Adult    DVT Prophylaxis: Low Risk/Ambulatory with no VTE prophylaxis indicated  Dove Catheter: Not present  Central Lines: None  Cardiac Monitoring: None  Code Status:   Full      Disposition Plan      Expected Discharge Date: 12/02/2022                The patient's care was discussed with the Bedside Nurse, Patient and ED physician, Dr. Vazquez .          ______________________________________________________________________    Chief Complaint   Asthma exacerbation    History is obtained from the patient and chart review    History of Present Illness   Per ED note, \" Duncan Dumont is a 22 year old male with PMH notable for moderate persistent asthma, medical treatment nonadherence, GERD, recent influenza A diagnosis on 11/28/2022 who presents to the ED with shortness of breath.  Patient reports that his asthma felt more improved after his recent ED visit, but " "that was transient and he then has had more wheezing and shortness of breath.  He was unable to  the prescribed prednisone, was told by the pharmacist that he could not pick it up until 2022 because of insurance clearance.  He has been using his albuterol, but the improvement is only transient.  He also endorses nausea but has had no vomiting. \"    Review of Systems    The 10 point Review of Systems is negative other than noted in the HPI or here.    Past Medical History    I have reviewed this patient's medical history and updated it with pertinent information if needed.   Past Medical History:   Diagnosis Date    Asthma exacerbation 10/11/2013    Hospitalized 10/11-10/14/13    Asthma exacerbation     Hospitalized -8/25/15 - PICU    Asthma exacerbation     Hospitalized 16 - PICU    Constipation     required enema in     Moderate persistent asthma 2008    Hospitalized -08 at [facility] with acute exacerbation.      Moderate persistent asthma 2008    Hospitalized -2009    Moderate persistent asthma     Hospitalized 2/       Past Surgical History   I have reviewed this patient's surgical history and updated it with pertinent information if needed.  Past Surgical History:   Procedure Laterality Date    CIRCUMCISION,OTHER,<28 D/O      as        Social History   I have reviewed this patient's social history and updated it with pertinent information if needed.  Social History     Tobacco Use    Smoking status: Every Day     Types: Vaping Device    Smokeless tobacco: Never    Tobacco comments:     mom quit   Substance Use Topics    Alcohol use: No     Alcohol/week: 0.0 standard drinks    Drug use: Yes     Comment: denies       Family History   I have reviewed this patient's family history and updated it with pertinent information if needed.  Family History   Problem Relation Age of Onset    Asthma Mother     Allergies Mother        Prior to Admission " Medications   Prior to Admission Medications   Prescriptions Last Dose Informant Patient Reported? Taking?   albuterol (PROAIR HFA/PROVENTIL HFA/VENTOLIN HFA) 108 (90 Base) MCG/ACT inhaler   No No   Sig: Inhale 1-2 puffs into the lungs every 6 hours   albuterol (PROAIR RESPICLICK) 108 (90 Base) MCG/ACT inhaler   No No   Sig: Inhale 1-2 puffs into the lungs every 4 hours as needed for shortness of breath / dyspnea or wheezing   cyclobenzaprine (FLEXERIL) 10 MG tablet   No No   Sig: Take 1 tablet (10 mg) by mouth 3 times daily as needed for muscle spasms   doxycycline monohydrate (MONODOX) 100 MG capsule   No No   Sig: Take 1 capsule (100 mg) by mouth 2 times daily (with meals) for 10 days Increases risk of heartburn and also sun sensitivity or sun burn. Contraindicated in pregnancy.   ibuprofen (ADVIL/MOTRIN) 600 MG tablet   No No   Sig: Take 1 tablet (600 mg) by mouth every 6 hours as needed for moderate pain (4-6)   Patient not taking: Reported on 11/30/2022   ipratropium - albuterol 0.5 mg/2.5 mg/3 mL (DUONEB) 0.5-2.5 (3) MG/3ML neb solution   No No   Sig: Take 1 vial (3 mLs) by nebulization every 4 hours as needed for shortness of breath / dyspnea or wheezing   methocarbamol (ROBAXIN) 500 MG tablet   No No   Sig: Take 1 tablet (500 mg) by mouth 4 times daily as needed for muscle spasms (back pain)   Patient not taking: Reported on 11/30/2022   ondansetron (ZOFRAN ODT) 4 MG ODT tab   Yes No   Sig: Place 8 mg under the tongue   oseltamivir (TAMIFLU) 75 MG capsule 11/30/2022  Yes Yes   Sig: Take 75 mg by mouth   predniSONE (DELTASONE) 20 MG tablet   No No   Sig: Take 2 tablets (40 mg) by mouth daily for 7 days      Facility-Administered Medications Last Administration Doses Remaining   ipratropium - albuterol 0.5 mg/2.5 mg/3 mL (DUONEB) neb solution 3 mL 11/30/2022  3:29 PM 0        Allergies   Allergies   Allergen Reactions    Cats      Rash, itching, cough, nasal congestion         Physical Exam   Vital Signs:  Temp: 98.6  F (37  C) Temp src: Oral BP: 104/70 Pulse: 51   Resp: 16 SpO2: 94 % O2 Device: Nasal cannula Oxygen Delivery: 1 LPM  Weight: 0 lbs 0 oz    Constitutional: healthy, alert and no distress   Head: Normocephalic. No masses, lesions, tenderness or abnormalities   Neck: Neck supple. No adenopathy. Thyroid symmetric, normal size,, Carotids without bruits.   ENT: ENT exam normal, no neck nodes or sinus tenderness   Cardiovascular: RRR. No murmurs, clicks gallops or rub   Respiratory: . Good diaphragmatic excursion. Lungs clear   Gastrointestinal: Abdomen soft,. BS normal. No masses, organomegaly.   : Deferred   Musculoskeletal: extremities normal- no gross deformities noted, gait normal and normal muscle tone   Skin: no suspicious lesions or rashes   Neurologic: Gait normal. Reflexes normal and symmetric. Sensation grossly WNL.   Psychiatric: mentation appears normal and affect normal/bright   Hematologic/Lymphatic/Immunologic: normal ant/post cervical, axillary, supraclavicular and inguinal      Data   Data reviewed today: I reviewed all medications, new labs and imaging results over the last 24 hours.

## 2022-12-01 NOTE — DISCHARGE INSTRUCTIONS
Take your Tamiflu as prescribed and you can start your steroids in 2 days. Return with any worsening or concerns. Follow up with your clinic doctor in 2 weeks if not improving.

## 2022-12-01 NOTE — ED PROVIDER NOTES
History     Chief Complaint   Patient presents with     Flu Symptoms     Patient has been seen at multiple places over the past few days, unable to fill his prednisone prescription, urgent care told him to come in.      MARIUSZ Dumont is a 22 year old male with PMH notable for moderate persistent asthma, medical treatment nonadherence, GERD, recent influenza A diagnosis on 2022 who presents to the ED with shortness of breath.  Patient reports that his asthma felt more improved after his recent ED visit, but that was transient and he then has had more wheezing and shortness of breath.  He was unable to  the prescribed prednisone, was told by the pharmacist that he could not pick it up until 2022 because of insurance clearance.  He has been using his albuterol, but the improvement is only transient.  He also endorses nausea but has had no vomiting, no diarrhea.  Minimal abdominal discomfort.  Minimal body aches, no fever.    Past Medical History  Past Medical History:   Diagnosis Date     Asthma exacerbation 10/11/2013    Hospitalized 10/11-10/14/13     Asthma exacerbation     Hospitalized -8/25/15 - PICU     Asthma exacerbation     Hospitalized 16 - PICU     Constipation     required enema in      Moderate persistent asthma 2008    Hospitalized -08 at [facility] with acute exacerbation.       Moderate persistent asthma 2008    Hospitalized -2009     Moderate persistent asthma     Hospitalized 2/     Past Surgical History:   Procedure Laterality Date     CIRCUMCISION,OTHER,<28 D/O      as      fluticasone (FLOVENT HFA) 44 MCG/ACT inhaler  oseltamivir (TAMIFLU) 75 MG capsule  albuterol (PROAIR HFA/PROVENTIL HFA/VENTOLIN HFA) 108 (90 Base) MCG/ACT inhaler  albuterol (PROAIR RESPICLICK) 108 (90 Base) MCG/ACT inhaler  cyclobenzaprine (FLEXERIL) 10 MG tablet  doxycycline monohydrate (MONODOX) 100 MG capsule  ibuprofen (ADVIL/MOTRIN) 600  MG tablet  ipratropium - albuterol 0.5 mg/2.5 mg/3 mL (DUONEB) 0.5-2.5 (3) MG/3ML neb solution  methocarbamol (ROBAXIN) 500 MG tablet  ondansetron (ZOFRAN ODT) 4 MG ODT tab  predniSONE (DELTASONE) 20 MG tablet      Allergies   Allergen Reactions     Cats      Rash, itching, cough, nasal congestion       Social History   Social History     Tobacco Use     Smoking status: Every Day     Types: Vaping Device     Smokeless tobacco: Never     Tobacco comments:     mom quit   Substance Use Topics     Alcohol use: No     Alcohol/week: 0.0 standard drinks     Drug use: Yes     Comment: denies      Past medical history and social history were reviewed with the patient. Additional pertinent items: None     Review of Systems  A complete review of systems was performed with pertinent positives and negatives noted in the HPI, and all other systems negative.    Physical Exam   BP: 104/70  Pulse: 51  Temp: 98.6  F (37  C)  Resp: 16  SpO2: 97 %    Physical Exam  General: no acute distress. Appears stated age.  Initially sleeping, awakens easily to voice.  HENT: MMM, no oropharyngeal lesions  Eyes: PERRL, normal sclerae  Cardio: Borderline bradycardic rate. Regular rhythm. Extremities well perfused  Resp: Mildly increased work of breathing, normal respiratory rate, mildly prolonged expiratory phase.  Scattered wheezes  Neuro: alert and fully oriented. CN II-XII grossly intact. Grossly normal strength and sensation in all extremities.   MSK: no deformities. Grossly normal ROM in extremities.   Integumentary/Skin: no rash visualized, normal color  Psych: normal affect, normal behavior    ED Course      Procedures          Labs Ordered and Resulted from Time of ED Arrival to Time of ED Departure - No data to display  No orders to display          Assessments & Plan (with Medical Decision Making)   Patient presenting with shortness of breath and wheezing in the context of underlying asthma with recent influenza A diagnosis. Vitals in the  ED notable for SPO2 97% in triage, was about 90% on room air at the time of physician evaluation. Nursing notes reviewed.     Labs from the evening of 11/29/2022 were reviewed, WBC 11.5, otherwise unremarkable.  Chest x-ray from that ED visit also reviewed and is without evidence of infiltrate, pneumothorax, effusion, etc.  Viral swab from 11/28/2022 in the [facility] system positive for influenza A.    Dexamethasone given in the ED to provide about 2 days of coverage until patient is cleared to  his prescribed prednisone.  Patient also notably does not have any prescribed inhaled steroids, so a prescription for fluticasone was prepared.    In the ED, the patient's symptoms were managed with duo nebs, with only mild improvement in symptoms upon reassessment.  Upon reassessment after 2 duo nebs, patient had SPO2 88%.  Wheezing improved but still present.  Patient noted feeling uncomfortable with his symptom level at home, is agreeable to observation admission for further symptom control.  Patient admitted to ED Obs.    Medical Decision Making  The patient presented with a problem that is a chronic illness mild to moderate exacerbation, progression, or side effect of treatment.    The patient's evaluation involved review of 2 prior external note(s), review of 3+ test result(s) ordered prior to this encounter and independent interpretation of testing performed by another health professional.    The patient's management involved a decision regarding hospitalization.        Final diagnoses:   Exacerbation of asthma, unspecified asthma severity, unspecified whether persistent   Influenza A     New Prescriptions    FLUTICASONE (FLOVENT HFA) 44 MCG/ACT INHALER    Inhale 1 puff into the lungs 2 times daily       --  North Vazquez MD   Emergency Medicine   [facility] EMERGENCY DEPARTMENT  11/30/2022     North Vazquez MD  12/01/22 0302       Nroth Vazquez MD  12/01/22 0305

## 2022-12-01 NOTE — ED TRIAGE NOTES
Patient has been seen at multiple places over the past few days, unable to fill his prednisone prescription, urgent care told him to come in.      Triage Assessment     Row Name 11/30/22 6300       Triage Assessment (Adult)    Airway WDL WDL       Respiratory WDL    Respiratory WDL X;cough;rhythm/pattern    Rhythm/Pattern, Respiratory shortness of breath    Cough Frequency infrequent       Skin Circulation/Temperature WDL    Skin Circulation/Temperature WDL WDL       Cardiac WDL    Cardiac WDL WDL       Peripheral/Neurovascular WDL    Peripheral Neurovascular WDL WDL       Cognitive/Neuro/Behavioral WDL    Cognitive/Neuro/Behavioral WDL WDL

## 2022-12-03 ENCOUNTER — HOSPITAL ENCOUNTER (EMERGENCY)
Facility: CLINIC | Age: 22
Discharge: HOME | End: 2022-12-03
Attending: EMERGENCY MEDICINE | Admitting: EMERGENCY MEDICINE
Payer: COMMERCIAL

## 2022-12-03 VITALS
SYSTOLIC BLOOD PRESSURE: 96 MMHG | TEMPERATURE: 98.3 F | DIASTOLIC BLOOD PRESSURE: 51 MMHG | BODY MASS INDEX: 17.61 KG/M2 | HEIGHT: 72 IN | RESPIRATION RATE: 12 BRPM | OXYGEN SATURATION: 95 % | WEIGHT: 130 LBS | HEART RATE: 60 BPM

## 2022-12-03 DIAGNOSIS — R11.0 NAUSEA: ICD-10-CM

## 2022-12-03 DIAGNOSIS — R06.2 WHEEZING: ICD-10-CM

## 2022-12-03 DIAGNOSIS — Z87.09 HISTORY OF ASTHMA: ICD-10-CM

## 2022-12-03 PROCEDURE — 250N000011 HC RX IP 250 OP 636: Performed by: EMERGENCY MEDICINE

## 2022-12-03 PROCEDURE — 250N000009 HC RX 250: Performed by: EMERGENCY MEDICINE

## 2022-12-03 PROCEDURE — 99283 EMERGENCY DEPT VISIT LOW MDM: CPT | Mod: 25 | Performed by: EMERGENCY MEDICINE

## 2022-12-03 PROCEDURE — 99284 EMERGENCY DEPT VISIT MOD MDM: CPT | Performed by: EMERGENCY MEDICINE

## 2022-12-03 PROCEDURE — 94640 AIRWAY INHALATION TREATMENT: CPT | Performed by: EMERGENCY MEDICINE

## 2022-12-03 PROCEDURE — 250N000013 HC RX MED GY IP 250 OP 250 PS 637: Performed by: EMERGENCY MEDICINE

## 2022-12-03 RX ORDER — ALBUTEROL SULFATE 0.83 MG/ML
2.5 SOLUTION RESPIRATORY (INHALATION) ONCE
Status: COMPLETED | OUTPATIENT
Start: 2022-12-03 | End: 2022-12-03

## 2022-12-03 RX ORDER — ONDANSETRON 4 MG/1
4 TABLET, ORALLY DISINTEGRATING ORAL ONCE
Status: COMPLETED | OUTPATIENT
Start: 2022-12-03 | End: 2022-12-03

## 2022-12-03 RX ORDER — ONDANSETRON 4 MG/1
4 TABLET, ORALLY DISINTEGRATING ORAL EVERY 8 HOURS PRN
Qty: 15 TABLET | Refills: 0 | Status: SHIPPED | OUTPATIENT
Start: 2022-12-03

## 2022-12-03 RX ADMIN — ALBUTEROL SULFATE 2.5 MG: 2.5 SOLUTION RESPIRATORY (INHALATION) at 21:19

## 2022-12-03 RX ADMIN — ALUMINUM HYDROXIDE, MAGNESIUM HYDROXIDE, AND SIMETHICONE: 200; 200; 20 SUSPENSION ORAL at 21:47

## 2022-12-03 RX ADMIN — ONDANSETRON 4 MG: 4 TABLET, ORALLY DISINTEGRATING ORAL at 20:20

## 2022-12-03 ASSESSMENT — ENCOUNTER SYMPTOMS
SORE THROAT: 0
NERVOUS/ANXIOUS: 0
NAUSEA: 1
DIFFICULTY URINATING: 0
DIARRHEA: 0
ARTHRALGIAS: 0
HEMATURIA: 0
CHILLS: 0
SHORTNESS OF BREATH: 0
HEADACHES: 0
VOMITING: 0
COLOR CHANGE: 0
FEVER: 0
FLANK PAIN: 0
CONFUSION: 0
WHEEZING: 1
ABDOMINAL PAIN: 0

## 2022-12-03 ASSESSMENT — ACTIVITIES OF DAILY LIVING (ADL)
ADLS_ACUITY_SCORE: 35
ADLS_ACUITY_SCORE: 35

## 2022-12-03 ASSESSMENT — PULMONARY FUNCTION TESTS: PRE_BRONCH_PEFR_L/MIN: 250

## 2022-12-04 NOTE — ED TRIAGE NOTES
Pt  Nauseated ran out of Zofran.   Triage Assessment     Row Name 12/03/22 2013       Triage Assessment (Adult)    Airway WDL WDL       Respiratory WDL    Respiratory WDL WDL       Skin Circulation/Temperature WDL    Skin Circulation/Temperature WDL WDL       Cardiac WDL    Cardiac WDL WDL       Peripheral/Neurovascular WDL    Peripheral Neurovascular WDL WDL       Cognitive/Neuro/Behavioral WDL    Cognitive/Neuro/Behavioral WDL WDL

## 2022-12-04 NOTE — ED PROVIDER NOTES
"ED Provider Note  [facility]      History     Chief Complaint   Patient presents with     Nausea     HPI  Duncan Dumont is a 22 year old male who presents to the ED today with nausea. Patient has a hx of asthma. He reports that he was diagnosed with influenza on 11/28. He was given a rx for Tamiflu. He was seen in Urgent Care on 11/29 with SOB and wheezing, diagnosed with an asthma exacerbation. He was given nebs and solumedrol in Urgent Care, discharged with doxycycline x 10 days, prednisone x 7 days and duonebs. He was unable to  his medications because the pharmacy was closed.  He had increasing shortness of breath and then was seen in the ED on November 29 for this.  He was treated for his asthma, instructed to fill his prescriptions and was discharged.  Due to insurance restrictions, he was not eligible to  his prednisone until December 2.  He was then seen in the emergency department on November 30 for the same symptoms, but reports that his breathing was worsening.  He was noted to have an asthma exacerbation.  Plan was to admit to ED observation, but the patient changed his mind after being treated in the emergency department and feeling better.  He ultimately was discharged with a prescription for fluticasone inhaler as well.    Patient reports that he has been taking his Tamiflu, his prednisone, and his doxycycline.  He had previously been on Zofran and reports that he has prescription ran out and he has been nauseated for the past couple of days.  He denies abdominal pain.  Denies vomiting.  Denies any fevers or chills.  No nasal congestion or sore throat.  He reports that his cough and wheezing/shortness of breath has improved.  He does still have some wheezing.  He denies any diarrhea, denies any urinary symptoms.  No prior abdominal surgeries.    He was concerned that his symptoms were due to \"eating mold\".  When asked why he is concerned that he was " "having nausea from \"eating mold\", he says that he had a dish that had mold on it and he washed it and then used that plate to put new food on, and ate that food. He is concerned that this is why he has nausea.     He reports he does not have a primary care clinic.    Past Medical History:   Diagnosis Date     Asthma exacerbation 10/11/2013    Hospitalized 10/11-10/14/13     Asthma exacerbation     Hospitalized -8/25/15 - PICU     Asthma exacerbation     Hospitalized 16 - PICU     Constipation     required enema in      Moderate persistent asthma 2008    Hospitalized -08 at [facility] with acute exacerbation.       Moderate persistent asthma 2008    Hospitalized -2009     Moderate persistent asthma     Hospitalized 2/       Past Surgical History:   Procedure Laterality Date     CIRCUMCISION,OTHER,<28 D/O      as        Family History   Problem Relation Age of Onset     Asthma Mother      Allergies Mother        Social History     Tobacco Use     Smoking status: Former     Types: Vaping Device     Smokeless tobacco: Never     Tobacco comments:     mom quit   Substance Use Topics     Alcohol use: No     Alcohol/week: 0.0 standard drinks         Past Medical History  Past Medical History:   Diagnosis Date     Asthma exacerbation 10/11/2013    Hospitalized 10/11-10/14/13     Asthma exacerbation     Hospitalized -8/25/15 - PICU     Asthma exacerbation     Hospitalized 16 - PICU     Constipation     required enema in      Moderate persistent asthma 2008    Hospitalized -08 at [facility] with acute exacerbation.       Moderate persistent asthma 2008    Hospitalized -2009     Moderate persistent asthma     Hospitalized 2/     Past Surgical History:   Procedure Laterality Date     CIRCUMCISION,OTHER,<28 D/O      as      omeprazole (PRILOSEC) 20 MG DR capsule  ondansetron (ZOFRAN ODT) 4 MG ODT tab  ondansetron " (ZOFRAN ODT) 4 MG ODT tab  albuterol (PROAIR HFA/PROVENTIL HFA/VENTOLIN HFA) 108 (90 Base) MCG/ACT inhaler  albuterol (PROAIR RESPICLICK) 108 (90 Base) MCG/ACT inhaler  cyclobenzaprine (FLEXERIL) 10 MG tablet  doxycycline monohydrate (MONODOX) 100 MG capsule  fluticasone (FLOVENT HFA) 44 MCG/ACT inhaler  ibuprofen (ADVIL/MOTRIN) 600 MG tablet  ipratropium - albuterol 0.5 mg/2.5 mg/3 mL (DUONEB) 0.5-2.5 (3) MG/3ML neb solution  methocarbamol (ROBAXIN) 500 MG tablet  predniSONE (DELTASONE) 20 MG tablet      Allergies   Allergen Reactions     Cats      Rash, itching, cough, nasal congestion       Family History  Family History   Problem Relation Age of Onset     Asthma Mother      Allergies Mother      Social History   Social History     Tobacco Use     Smoking status: Former     Types: Vaping Device     Smokeless tobacco: Never     Tobacco comments:     mom quit   Substance Use Topics     Alcohol use: No     Alcohol/week: 0.0 standard drinks     Drug use: Yes     Types: Marijuana     Comment: denies      Past medical history, past surgical history, medications, allergies, family history, and social history were reviewed with the patient. No additional pertinent items.       Review of Systems   Constitutional: Negative for chills and fever.   HENT: Negative for congestion and sore throat.    Respiratory: Positive for wheezing. Negative for shortness of breath.    Cardiovascular: Negative for chest pain.   Gastrointestinal: Positive for nausea. Negative for abdominal pain, diarrhea and vomiting.   Genitourinary: Negative for difficulty urinating, flank pain and hematuria.   Musculoskeletal: Negative for arthralgias.   Skin: Negative for color change.   Neurological: Negative for headaches.   Psychiatric/Behavioral: Negative for confusion. The patient is not nervous/anxious.    All other systems reviewed and are negative.    A complete review of systems was performed with pertinent positives and negatives noted in the  HPI, and all other systems negative.    Physical Exam   BP: 105/72  Pulse: 64  Temp: 98.3  F (36.8  C)  Resp: 18  Height: 182.9 cm (6')  Weight: 59 kg (130 lb)  SpO2: 95 %  Physical Exam  Vitals and nursing note reviewed.   Constitutional:       General: He is not in acute distress.     Appearance: He is underweight. He is not diaphoretic.      Comments: Adult male, lying back in bed, appears tired. NAD. Thin, underweight.   HENT:      Head: Atraumatic.      Mouth/Throat:      Mouth: Mucous membranes are moist.      Pharynx: Oropharynx is clear. No oropharyngeal exudate.   Eyes:      General: No scleral icterus.     Pupils: Pupils are equal, round, and reactive to light.   Cardiovascular:      Rate and Rhythm: Normal rate.      Pulses: Normal pulses.      Heart sounds: No murmur heard.  Pulmonary:      Effort: No respiratory distress.      Breath sounds: Wheezing present.      Comments: Speaking in full sentences. Wheezing noted throughout both lung fields. Scattered rhonchi. No crackles.  Abdominal:      General: Bowel sounds are normal.      Palpations: Abdomen is soft.      Tenderness: There is abdominal tenderness.      Comments: Very mild epigastric TTP without guarding or rebound. No lower abdominal TTP.    Musculoskeletal:         General: No tenderness.   Skin:     General: Skin is warm.      Findings: No rash.   Neurological:      General: No focal deficit present.   Psychiatric:         Mood and Affect: Mood normal.         ED Course      Procedures       The medical record was reviewed and interpreted.              No results found for any visits on 12/03/22.  Medications   ondansetron (ZOFRAN ODT) ODT tab 4 mg (4 mg Oral Given 12/3/22 2020)   lidocaine (viscous) (XYLOCAINE) 2 % 15 mL, alum & mag hydroxide-simethicone (MAALOX) 15 mL GI Cocktail ( Oral Given 12/3/22 2147)   albuterol (PROVENTIL) neb solution 2.5 mg (2.5 mg Nebulization Given 12/3/22 2119)        Assessments & Plan (with Medical Decision  Making)   Patient presents to the emergency department today with the above complaints.  He is reporting nausea after running out of his Zofran.  He is eating and drinking and has not vomited.    On exam he has mild tenderness to palpation in the epigastrium without guarding or rebound.  Abdominal exam is really quite benign. He does have wheezing which is not unusual for him. However he is saturating 95% on RA. No respiratory distress.  He did request and received 1 albuterol nebulizer treatment here in the ED.    With regard to his abdominal discomfort/nausea, I suspect that this may potentially be due to side effects from his medications including antibiotics and/or steroids.  He is not taking an antacid which he would benefit from particularly while on steroids.  He was given a GI cocktail here in the emergency department as well as a dose of Zofran and is feeling improved.    We have instructed him to take his antibiotic and his steroids with food and never on an empty stomach.  We will discharge him with prescriptions for Zofran as well as Prilosec.  Patient states he does not have a primary clinic so I will put a referral for him in the computer for primary care.  Patient verbalizes understanding    I have reviewed the nursing notes. I have reviewed the findings, diagnosis, plan and need for follow up with the patient.    Discharge Medication List as of 12/3/2022 10:50 PM      START taking these medications    Details   omeprazole (PRILOSEC) 20 MG DR capsule Take 1 capsule (20 mg) by mouth daily, Disp-14 capsule, R-0, Local Print      !! ondansetron (ZOFRAN ODT) 4 MG ODT tab Take 1 tablet (4 mg) by mouth every 8 hours as needed for nausea, Disp-15 tablet, R-0, Local Print       !! - Potential duplicate medications found. Please discuss with provider.          Final diagnoses:   Nausea - likely medication side effect   Wheezing   History of asthma       --  Karina Caban MD  [facility]  EMERGENCY DEPARTMENT  12/3/2022     Karina Caban MD  12/04/22 0013

## 2022-12-04 NOTE — DISCHARGE INSTRUCTIONS
You have been seen in the emergency department today for nausea.  It is possible that some of your medications are causing side effects for you.  The doxycycline antibiotic can cause stomach upset.  Also, the prednisone you are taking can irritate the inside lining of your stomach causing nausea and stomach upset.  We recommend that you take both of these with food, never on an empty stomach.  We are also giving you a prescription for an antacid medication that you should take in the mornings to help protect the stomach lining.  We are also refilling your prescription for Zofran for now.  Be sure to drink plenty of fluids to keep hydrated and use your regular medications.    We have placed a referral for you in the computer for primary care.  It is very important that you have a primary care clinic that you can go to for medication refills and routine health care.  Expect to receive a phone call in the next few days to set up an appointment with a primary care clinic.

## 2023-04-26 PROCEDURE — 99284 EMERGENCY DEPT VISIT MOD MDM: CPT | Performed by: EMERGENCY MEDICINE

## 2023-04-27 ENCOUNTER — APPOINTMENT (OUTPATIENT)
Dept: GENERAL RADIOLOGY | Facility: CLINIC | Age: 23
End: 2023-04-27
Attending: FAMILY MEDICINE
Payer: COMMERCIAL

## 2023-04-27 ENCOUNTER — HOSPITAL ENCOUNTER (EMERGENCY)
Facility: CLINIC | Age: 23
Discharge: HOME | End: 2023-04-27
Attending: EMERGENCY MEDICINE | Admitting: EMERGENCY MEDICINE
Payer: COMMERCIAL

## 2023-04-27 ENCOUNTER — HOSPITAL ENCOUNTER (EMERGENCY)
Facility: CLINIC | Age: 23
Discharge: HOME | End: 2023-04-27
Attending: FAMILY MEDICINE | Admitting: FAMILY MEDICINE
Payer: COMMERCIAL

## 2023-04-27 VITALS
WEIGHT: 140 LBS | TEMPERATURE: 98.2 F | DIASTOLIC BLOOD PRESSURE: 77 MMHG | SYSTOLIC BLOOD PRESSURE: 109 MMHG | BODY MASS INDEX: 18.99 KG/M2 | OXYGEN SATURATION: 99 % | RESPIRATION RATE: 16 BRPM | HEART RATE: 94 BPM

## 2023-04-27 VITALS
RESPIRATION RATE: 18 BRPM | BODY MASS INDEX: 18.96 KG/M2 | TEMPERATURE: 97.4 F | HEART RATE: 83 BPM | SYSTOLIC BLOOD PRESSURE: 107 MMHG | OXYGEN SATURATION: 97 % | DIASTOLIC BLOOD PRESSURE: 77 MMHG | WEIGHT: 140 LBS | HEIGHT: 72 IN

## 2023-04-27 DIAGNOSIS — M54.50 ACUTE LOW BACK PAIN, UNSPECIFIED BACK PAIN LATERALITY, UNSPECIFIED WHETHER SCIATICA PRESENT: ICD-10-CM

## 2023-04-27 DIAGNOSIS — F32.9 MAJOR DEPRESSIVE DISORDER, SINGLE EPISODE, UNSPECIFIED: ICD-10-CM

## 2023-04-27 DIAGNOSIS — F12.10 CANNABIS ABUSE, CONTINUOUS: ICD-10-CM
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
## 2023-04-27 PROCEDURE — 250N000013 HC RX MED GY IP 250 OP 250 PS 637: Performed by: EMERGENCY MEDICINE

## 2023-04-27 PROCEDURE — 72100 X-RAY EXAM L-S SPINE 2/3 VWS: CPT

## 2023-04-27 PROCEDURE — 250N000011 HC RX IP 250 OP 636: Performed by: FAMILY MEDICINE

## 2023-04-27 PROCEDURE — 96372 THER/PROPH/DIAG INJ SC/IM: CPT | Performed by: FAMILY MEDICINE

## 2023-04-27 PROCEDURE — 99284 EMERGENCY DEPT VISIT MOD MDM: CPT | Performed by: FAMILY MEDICINE

## 2023-04-27 PROCEDURE — 90791 PSYCH DIAGNOSTIC EVALUATION: CPT

## 2023-04-27 PROCEDURE — 99285 EMERGENCY DEPT VISIT HI MDM: CPT | Mod: 25

## 2023-04-27 RX ORDER — IBUPROFEN 600 MG/1
600 TABLET, FILM COATED ORAL ONCE
Status: COMPLETED | OUTPATIENT
Start: 2023-04-27 | End: 2023-04-27

## 2023-04-27 RX ORDER — ACETAMINOPHEN 325 MG/1
975 TABLET ORAL ONCE
Status: COMPLETED | OUTPATIENT
Start: 2023-04-27 | End: 2023-04-27

## 2023-04-27 RX ORDER — CYCLOBENZAPRINE HCL 10 MG
10 TABLET ORAL 2 TIMES DAILY PRN
Qty: 15 TABLET | Refills: 0 | Status: SHIPPED | OUTPATIENT
Start: 2023-04-27

## 2023-04-27 RX ORDER — CYCLOBENZAPRINE HCL 10 MG
10 TABLET ORAL ONCE
Status: COMPLETED | OUTPATIENT
Start: 2023-04-27 | End: 2023-04-27

## 2023-04-27 RX ORDER — NAPROXEN 500 MG/1
500 TABLET ORAL 2 TIMES DAILY WITH MEALS
Qty: 20 TABLET | Refills: 0 | Status: SHIPPED | OUTPATIENT
Start: 2023-04-27

## 2023-04-27 RX ORDER — NAPROXEN 500 MG/1
500 TABLET ORAL 2 TIMES DAILY WITH MEALS
Qty: 30 TABLET | Refills: 0 | Status: SHIPPED | OUTPATIENT
Start: 2023-04-27 | End: 2023-05-12

## 2023-04-27 RX ORDER — KETOROLAC TROMETHAMINE 30 MG/ML
30 INJECTION, SOLUTION INTRAMUSCULAR; INTRAVENOUS ONCE
Status: COMPLETED | OUTPATIENT
Start: 2023-04-27 | End: 2023-04-27

## 2023-04-27 RX ADMIN — CYCLOBENZAPRINE 10 MG: 10 TABLET, FILM COATED ORAL at 01:24

## 2023-04-27 RX ADMIN — IBUPROFEN 600 MG: 600 TABLET, FILM COATED ORAL at 01:24

## 2023-04-27 RX ADMIN — IBUPROFEN 600 MG: 600 TABLET, FILM COATED ORAL at 00:44

## 2023-04-27 RX ADMIN — KETOROLAC TROMETHAMINE 30 MG: 30 INJECTION, SOLUTION INTRAMUSCULAR; INTRAVENOUS at 12:44

## 2023-04-27 RX ADMIN — ACETAMINOPHEN 325MG 975 MG: 325 TABLET ORAL at 01:24

## 2023-04-27 ASSESSMENT — ACTIVITIES OF DAILY LIVING (ADL)
ADLS_ACUITY_SCORE: 36
ADLS_ACUITY_SCORE: 33
ADLS_ACUITY_SCORE: 36
ADLS_ACUITY_SCORE: 36

## 2023-04-27 ASSESSMENT — COLUMBIA-SUICIDE SEVERITY RATING SCALE - C-SSRS
3. HAVE YOU BEEN THINKING ABOUT HOW YOU MIGHT KILL YOURSELF?: NO
4. HAVE YOU HAD THESE THOUGHTS AND HAD SOME INTENTION OF ACTING ON THEM?: NO
ATTEMPT LIFETIME: NO
5. HAVE YOU STARTED TO WORK OUT OR WORKED OUT THE DETAILS OF HOW TO KILL YOURSELF? DO YOU INTEND TO CARRY OUT THIS PLAN?: NO
2. HAVE YOU ACTUALLY HAD ANY THOUGHTS OF KILLING YOURSELF?: YES
REASONS FOR IDEATION PAST MONTH: MOSTLY TO END OR STOP THE PAIN (YOU COULDN'T GO ON LIVING WITH THE PAIN OR HOW YOU WERE FEELING)
TOTAL  NUMBER OF INTERRUPTED ATTEMPTS LIFETIME: NO
6. HAVE YOU EVER DONE ANYTHING, STARTED TO DO ANYTHING, OR PREPARED TO DO ANYTHING TO END YOUR LIFE?: NO
1. IN THE PAST MONTH, HAVE YOU WISHED YOU WERE DEAD OR WISHED YOU COULD GO TO SLEEP AND NOT WAKE UP?: YES
TOTAL  NUMBER OF ABORTED OR SELF INTERRUPTED ATTEMPTS LIFETIME: NO
REASONS FOR IDEATION LIFETIME: MOSTLY TO END OR STOP THE PAIN (YOU COULDN'T GO ON LIVING WITH THE PAIN OR HOW YOU WERE FEELING)
4. HAVE YOU HAD THESE THOUGHTS AND HAD SOME INTENTION OF ACTING ON THEM?: NO
5. HAVE YOU STARTED TO WORK OUT OR WORKED OUT THE DETAILS OF HOW TO KILL YOURSELF? DO YOU INTEND TO CARRY OUT THIS PLAN?: NO
1. HAVE YOU WISHED YOU WERE DEAD OR WISHED YOU COULD GO TO SLEEP AND NOT WAKE UP?: YES
2. HAVE YOU ACTUALLY HAD ANY THOUGHTS OF KILLING YOURSELF?: YES

## 2023-04-27 NOTE — DISCHARGE INSTRUCTIONS
Dictation #1  MRN:[id number]  CSN:[id number]  [facility]  Liver Transplant  Discharge Summary      Patient Name: Zeina Mahoney  MRN: [id number]  Admission Date: 9/8/2020  Hospital Length of Stay: 5 days  Discharge Date and Time:  09/13/2020 2:03 PM  Attending Physician: Oskar Crawford  Discharging Provider: Oskar Crawford  Primary Care Provider: Primary Doctor No     HPI:   55 y/o female with pmh of esld secondary CHAUDHARY.  Presents to the ED with complaint of lower abdominal pain.  CT scan obtained, unremarkable to source of pain.  Lipase elevated on admit, has hx of pancreatic lesions.  Upon assessment mental status slowed.  Patient's  reports having regular bowel movements and mental status better then baseline.  However states very anxious needing haldol for symptom control.  Lower extremities with swelling, redness, warmth, and weeping open sores.   states wound was being dressed by home health. Plan to admit for observation to address these issues.  Will consult psychiatry to address anxiety and recommendations of better medication in setting of liver failure.  Wound care consult for multiple skin tears and lesions.  Infectious work up and empirically treat for cellulitis.    * No surgery found *     Hospital Course:    Patient admitted 9/8 with lower abdominal pain. CT A/P unremarkable for source of pain. Vancomycin started for possible LLE cellulitis (& recent staph epi bacteremia). Patient with worsening mental status 9/9; grade II-III. She was also tachycardic 120s-130s (EKG ST). Lactulose enema given with improvement in AMS noted. CXR with LLL atelectasis, otherwise unremarkable. Albumin x 1 given for hydration. Pt then spiked temp of 103.F. Abx broadened to Vanc/Cefepime. Blood cx repeated 9/9 (NGTD). She remained afebrile after 9/9 afternoon. Paracentesis attempted 9/10, but insufficient fluid to drain. Mental status much improved by 9/11 and abdominal pain resolved. Vancomycin  Therapy Appointment  Date: Monday, 5/1/2023  Time: 10:00 am - 11:00 am  Provider: Ananya Dorado  Location: [facility], [facility], [address], [address], [place] [address]  Phone: [phone]  Type: Therapy - Initial (In-Person)    [facility],  call [phone]    You can call that number Monday through Friday, 8 a.m. to 4 p.m. except holidays.    You will speak with someone to help you identify your needs and offer options for service.    We may refer you to services or agencies for treatment and support. These could include:    Adult rehabilitative mental health services (ARMHS)  Community support program access  Intensive residential treatment  Outpatient mental health services  Case management  Assertive Community Treatment (ACT)  Supportive housing  Supportive employment  Chemical health assessment and treatment  Connections to resources in the community    Aftercare Plan    If I am feeling unsafe or I am in a crisis, I will:   Contact my established care providers   Call the National Suicide Prevention Lifeline: 988  Go to the nearest emergency room   Call 911         The following DBT skills can assist me when: I want to act on your emotions and acting on them will only make things worse, I am overwhelmed by my emotions, I want to try to be skillful and not act on self destructive behavior.     Reduce Extreme Emotion  QUICKLY:  Changing Your Body Chemistry       T:  Change your body Temperature to change your autonomic nervous system    Use Ice pack to calm yourself down FAST. Place ice pack underneath your eyes for a count of 30 seconds to initiate the divers reflex which will naturally calm down your heart rate and breathing.      I:  Intensely exercise to calm down a body revved up by emotion    Examples: running, walking fast, jumping, playing basketball, weight lifting, swimming, calisthenics, etc.      Engage in exercises that DO NOT include violent  behaviors. Exercises that utilize violent behaviors tend to function as  behavioral rehearsal,  and rather than calming the person down, may actually  rev  the person up more, increasing the likelihood of violence, and lessening the likelihood that they will  burn off  energy     P:  Progressively relax your muscles    Starting with your hands, moving to your forearms, upper arms, shoulders, neck, forehead, eyes, cheeks and lips, tongue and teeth, chest, upper back, stomach, buttocks, thighs, calves, ankles, feet      Tense (10 seconds,   of the way), then relax each muscle (all the way)    Notice the tension    Notice the difference when relaxed (by tensing first, and then relaxing, you are able to get a more thorough relaxation than by simply relaxing)      P: Paced breathing to relax    The standard technique is to begin with counting the number of steps one takes for a typical inhale, then counting the steps one takes for a typical exhale, and then lengthening the amount of steps for the exhalation by one or two steps.  OR repeat this pattern for 1-2 minutes:   Inhale for four (4) seconds    Exhale for six (6) to eight (8) seconds        After using Distress Tolerance TIPP, TRY TO STOP!     S- Stop    Do not just react on your emotion urge. Stop! Freeze! Do not move a muscle! Your emotions may try to make you act without thinking. Stay in control! Take a step back Take a step back from the situation.    T- Take a break    Let go. Take a deep breath. Do not let your feelings make you act impulsively.    O- Observe    Notice what is going on inside and outside you. What is the situation? What are your thoughts and feelings? What are others saying or doing? Does my emotion make sense, is it justified? What is it that my emotions want me to do? Would that be effective?    P- Proceed mindfully    Act with awareness. In deciding what to do, consider your thoughts and feelings, the situation, and other people s  discontinued on 9/11. US BLE ordered to r/o DVT as RLE was edematous >L. BLE US negative for DVT. Wound care consulted for lower extremity ulcers -> suggested medi-honey to the affected area daily. Patient remained afebrile and continued to feel better, so Cefepime was discontinued on 9/12. She was monitored off abx overnight with no episodes of decompensation.    Of note, Psych consulted for worsening anxiety on admission. Psych recommended 5 mg Lexapro daily which was started 9/9 AM. Patient is to follow up with psych outpatient. She has an appointment scheduled with Dr. Jesus Baez on 9/23/20 at [facility] 4th floor. The appointment is in person as the Dr. Baez does not do liver evals via telepsych.    Patient is stable and ready for discharge at this time. Follow up to include labs and clinic appointment as scheduled per coordinator (AUGUST). Patient and caregiver are in agreement with discharge and follow up plan at this time.      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] 07/18/2020 Yes    Adjustment disorder with anxious mood [F43.22] 07/09/2020 Yes    Liver cirrhosis secondary to CHAUDHARY [K75.81, K74.60] 06/25/2020 Yes    Acute Hepatic encephalopathy [K72.90] 06/25/2020 Yes      Problems Resolved During this Admission:    Diagnosis Date Noted Date Resolved POA    Tachycardia [R00.0]  09/11/2020 No       Consults (From admission, onward)        Status Ordering Provider      Inpatient consult to Psychiatry  Once     Provider:  (Not yet assigned)    Completed DARIO BUTT          Pending Diagnostic Studies:     None        Significant Diagnostic Studies: Labs:   CMP   Recent Labs   Lab 09/12/20  0555 09/13/20  0649   * 132*   K 3.9 4.4    103   CO2 26 25   * 161*   BUN 18 12   CREATININE 0.8 0.9   CALCIUM 9.6 9.6   PROT 5.3* 5.0*   ALBUMIN 2.2* 2.1*   BILITOT 3.4* 3.5*   ALKPHOS 90 87   AST 36 30   ALT 21 19   ANIONGAP 5* 4*   ESTGFRAFRICA >60.0 >60.0   EGFRNONAA >60.0 >60.0    and CBC   Recent Labs   Lab 09/12/20  0555 09/13/20  0649   WBC 7.04 6.03   HGB 7.9* 7.7*   HCT 24.4* 23.6*   PLT 69* 78*       The patients clinical status was discussed at multidisplinary rounds, involving transplant surgery, transplant medicine, pharmacy, nursing, nutrition, and social work    Discharged Condition: stable    Disposition: DC locally    Follow Up: As above    Patient Instructions:      Diet Adult Regular     Notify your health care provider if you experience any of the following:  temperature >100.4     Notify your health care provider if you experience any of the following:  persistent nausea and vomiting or diarrhea     Notify your health care provider if you experience any of the following:  severe uncontrolled pain     Notify your health care provider if you experience any of the following:  redness, tenderness, or signs of infection (pain, swelling, redness, odor or green/yellow discharge around incision site)     Notify your health care provider if you experience any of the following:  difficulty breathing or increased cough     Notify your health care provider if you experience any of the following:  worsening rash     Notify your health care provider if you experience any of the following:  severe persistent headache     Notify your health care provider if you experience any of the following:  persistent dizziness, light-headedness, or visual disturbances  thoughts and feelings. Think about your goals. Ask Wise Mind: Which actions will make it better or worse?        If my emotion action urge would not be effective or helpful, practice acting OPPOSITE to the EMOTION ACTION URGE can help reduce the intensity or even change the emotion.   Consider these examples: with FEAR we have the urge to run away/avoid. OPPOSITE would be to approach it with caution. ANGER we have the urge to attack. OPPOSITE would be to gently avoid or to demonstrate kindness towards it. SADNESS we have the urge to withdraw/isolate. OPPOSITE would be to get self to move and be active physically or socially.      These additional skills may help with self-soothing and distracting you:      Activities   Focus attention on a task you need to get done. Rent movies; watch TV. Clean a room in your house. Find an event to go to. Play computer games. Go walking. Exercise. Surf the Internet. Write e-mails. Play sports. Go out for a meal or eat a favorite food. Call or go out with a friend. Listen to your iPod; download music. Build something. Spend time with your children. Play cards. Read magazines, books, comics. Do crossword puzzles or Sudoku.     Emotions   Read emotional books or stories, old letters. Watch emotional TV shows; go to emotional movies. Listen to emotional music. (Be sure the event creates different emotions.) Ideas: Scary movies, joke books, comedies, funny records, Oriental orthodox music, soothing music or music that fires you up, going to a store and reading funny greeting cards.     Thoughts   Count to 10; count colors in a painting or poster or out the window; count anything. Repeat words to a song in your mind. Work puzzles. Watch TV or read.     Sensations   Squeeze a rubber ball very hard. Listen to very loud music. Hold ice in your hand or mouth. Go out in the rain or snow. Take a hot or cold shower.   Remember that you can use your 5 senses as helpful self-soothing tools!       I can      Notify your health care provider if you experience any of the following:  increased confusion or weakness     Notify your health care provider if you experience any of the following:   Order Comments: For any other concerning signs or symptoms     Activity as tolerated     Medications:  Reconciled Home Medications:      Medication List      START taking these medications    escitalopram oxalate 5 MG Tab  Commonly known as: LEXAPRO  Take 1 tablet (5 mg total) by mouth once daily.  Start taking on: September 14, 2020        CONTINUE taking these medications    acetaminophen 500 MG tablet  Commonly known as: TYLENOL  Take 1 tablet (500 mg total) by mouth every 6 (six) hours as needed for Pain. Max 2000mg daily.     diaper,brief,adult,disposable Misc  5 mg/kg/day by Misc.(Non-Drug; Combo Route) route 5 (five) times daily. Patient with incontinence. Please supply enough depends for 5xs/day     furosemide 40 MG tablet  Commonly known as: LASIX  Take 1 tablet (40 mg total) by mouth once daily. Take 2 tablets (80 mg) by mouth in the morning and 1 tablet (40mg) in the evening.     haloperidoL 1 MG tablet  Commonly known as: HALDOL  Take 1 tablet (1 mg total) by mouth daily as needed (non redirectable agitation).     KRISTALOSE 10 gram packet  Generic drug: lactulose  Mix 3 packets (30 g total) with liquid and take by mouth 3 (three) times daily as needed.     levothyroxine 88 MCG tablet  Commonly known as: SYNTHROID  Take 1 tablet (88 mcg total) by mouth before breakfast.     lidocaine 5 %  Commonly known as: LIDODERM  Place 1 patch onto the skin every 24 hours. Remove & Discard patch within 12 hours or as directed by MD     magnesium hydroxide 400 mg/5 ml 400 mg/5 mL Susp  Commonly known as: MILK OF MAGNESIA  Take 30 mLs (2,400 mg total) by mouth daily as needed (Take if constipated despite lactulose).     melatonin 10 mg Tab  Take 10 mg by mouth nightly as needed (sleep).     midodrine 10 MG tablet  Commonly known as:  "help my own emotions by practicing the following to keep my emotional mind healthy and bring positive emotions:     The ABC PLEASE skill is about taking good care of ourselves so that we can take care of others. Also, an important component of DBT is to reduce our vulnerability. When we take good care of ourselves, we are less likely to be vulnerable to disease and emotional crisis.     ABC   A- Accumulate positive emotions by doing things that are pleasant.   B- Build mastery by doing things we enjoy. Whether it is reading, cooking, cleaning, fixing a car, working a cross word puzzle, or playing a musical instrument. Practice these things to  and in time we feel competent.   C- [place] by rehearsing a plan ahead of time so that we can be prepared to cope skillfully. (Think of what makes situations difficult, and what helps in those situations)      PLEASE   Treat Physical Illness and take medications as prescribed.   Balance eating in order to avoid mood swings.   Avoid mood-Altering substances and have mood control.   Maintain good sleep so you can enjoy your life.   Get exercise to maintain high spirits.     Crisis Lines  Crisis Text Line  Text 296218  You will be connected with a trained live crisis counselor to provide support.    Por espanol, texto  BRIANNA a 650170 o texto a 442-AYUDAME en WhatsApp    [employer] (LGBTQ Youth Crisis Line)  8.596.484.7742  text START to 013-072      Community Resources  Fast Tracker  Linking people to mental health and substance use disorder resources  fasttrackermn.org     [place] Mental Health Warm Line  Peer to peer support  Monday thru Saturday, 12 pm to 10 pm  085.413.9068 or 1.872.675.3396  Text \"Support\" to 65319    National [place] on Mental Illness (DARRIUS)  978.055.1477 or 1.888.DARRIUS.HELPS      Mental Health Apps  My3  https://my3app.org/    VirtualHopeBox  https://Jule Game.org/apps/virtual-hope-box/      Additional " Information  Today you were seen by a licensed mental health professional through Triage and Transition services, [facility] ([facility])  for a crisis assessment in the Emergency Department at [facility].  It is recommended that you follow up with your established providers (psychiatrist, mental health therapist, and/or primary care doctor - as relevant) as soon as possible. Coordinators from [facility] will be calling you in the next 24-48 hours to ensure that you have the resources you need.  You can also contact [facility] coordinators directly at [phone]. You may have been scheduled for or offered an appointment with a mental health provider. [facility] maintains an extensive network of licensed behavioral health providers to connect patients with the services they need.  We do not charge providers a fee to participate in our referral network.  We match patients with providers based on a patient's specific needs, insurance coverage, and location.  Our first effort will be to refer you to a provider within your care system, and will utilize providers outside your care system as needed.     PROAMATINE  Take 10 mg by mouth 3 (three) times daily.     nystatin powder  Commonly known as: MYCOSTATIN  Apply topically 2 (two) times daily. Apply to affected area twice a day     pantoprazole 40 MG tablet  Commonly known as: PROTONIX  Take 40 mg by mouth before breakfast.     polyethylene glycol 17 gram Pwpk  Commonly known as: GLYCOLAX  Take 17 g by mouth once daily. Or miralax OTC. Take if constipated despite lactulose     rifAXIMin 550 mg Tab  Commonly known as: XIFAXAN  Take 550 mg by mouth 2 (two) times daily.     simethicone 80 MG chewable tablet  Commonly known as: MYLICON  Take 80 mg by mouth every 6 (six) hours as needed for Flatulence. Take 2 tablets by mouth every 6 hours as needed for gas     spironolactone 50 MG tablet  Commonly known as: ALDACTONE  Take 2 tablets (100 mg total) by mouth once daily.     VITAMIN D2 50,000 unit Cap  Generic drug: ergocalciferol  Take 50,000 Units by mouth every 7 days.     zinc sulfate 220 (50) mg capsule  Commonly known as: ZINCATE  Take 220 mg by mouth before breakfast.        STOP taking these medications    sodium bicarbonate 650 MG tablet          Time spent caring for patient (Greater than 1/2 spent in direct face-to-face contact): > 30 minutes      Liver Transplant  [facility]

## 2023-04-27 NOTE — ED TRIAGE NOTES
See chief complaint     Triage Assessment     Row Name 04/27/23 0009       Respiratory WDL    Respiratory WDL WDL       Skin Circulation/Temperature WDL    Skin Circulation/Temperature WDL WDL       Cardiac WDL    Cardiac WDL WDL       Peripheral/Neurovascular WDL    Peripheral Neurovascular WDL WDL       Cognitive/Neuro/Behavioral WDL    Cognitive/Neuro/Behavioral WDL WDL
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
## 2023-04-27 NOTE — ED NOTES
"Patient refusing to leave asking to speak to provider. Dr. Palencia at bedside spoke with patient. Into discharge patient refusing to leave. \"Nobody has helped my back pain\" Motrin given to patient continues to refuse to leave. Security called to bedside to escort patient out of the ER.   "
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
## 2023-04-27 NOTE — ED NOTES
"Patient angry and posturing in the doorway, writer notified of patient condition. Went to see the patient who requested to see the doctor. Pt states, \"you guys aren't treating my pain, I am not going to be discharged.\" Dr. Santiago notified, directed to discharge patient. Patient declined discharge vitals and signing discharge paperwork. Patient walked towards door and stopped at nurses station with a raised voice demanded to see the doctor. MD spoke to the patient. Four security guards needed to walk the patient out of the building due to posturing and aggravation.   "

## 2023-06-02 ENCOUNTER — HOSPITAL ENCOUNTER (EMERGENCY)
Facility: CLINIC | Age: 23
Discharge: HOME | End: 2023-06-02
Attending: EMERGENCY MEDICINE | Admitting: EMERGENCY MEDICINE
Payer: COMMERCIAL

## 2023-06-02 VITALS
DIASTOLIC BLOOD PRESSURE: 62 MMHG | RESPIRATION RATE: 18 BRPM | OXYGEN SATURATION: 99 % | HEART RATE: 56 BPM | BODY MASS INDEX: 18.99 KG/M2 | SYSTOLIC BLOOD PRESSURE: 99 MMHG | TEMPERATURE: 98.4 F | WEIGHT: 140 LBS

## 2023-06-02 DIAGNOSIS — Z51.89 VISIT FOR WOUND CHECK: ICD-10-CM

## 2023-06-02 PROCEDURE — 250N000013 HC RX MED GY IP 250 OP 250 PS 637: Performed by: EMERGENCY MEDICINE

## 2023-06-02 PROCEDURE — 99284 EMERGENCY DEPT VISIT MOD MDM: CPT | Performed by: EMERGENCY MEDICINE

## 2023-06-02 RX ORDER — CEPHALEXIN 500 MG/1
500 CAPSULE ORAL 4 TIMES DAILY
Qty: 40 CAPSULE | Refills: 0 | Status: SHIPPED | OUTPATIENT
Start: 2023-06-02 | End: 2023-06-12

## 2023-06-02 RX ORDER — OXYCODONE HYDROCHLORIDE 5 MG/1
5 TABLET ORAL EVERY 6 HOURS PRN
Qty: 12 TABLET | Refills: 0 | Status: SHIPPED | OUTPATIENT
Start: 2023-06-02 | End: 2023-06-05

## 2023-06-02 RX ORDER — OXYCODONE AND ACETAMINOPHEN 5; 325 MG/1; MG/1
2 TABLET ORAL ONCE
Status: COMPLETED | OUTPATIENT
Start: 2023-06-02 | End: 2023-06-02

## 2023-06-02 RX ADMIN — OXYCODONE HYDROCHLORIDE AND ACETAMINOPHEN 2 TABLET: 5; 325 TABLET ORAL at 19:51
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
## 2023-06-03 NOTE — DISCHARGE INSTRUCTIONS
Take the medications as prescribed. Follow up with your primary care physician in the next 2-3 days for a repeat wound check.

## 2023-06-07 ENCOUNTER — NURSE TRIAGE (OUTPATIENT)
Dept: NURSING | Facility: CLINIC | Age: 23
End: 2023-06-07
Payer: COMMERCIAL

## 2023-06-07 NOTE — TELEPHONE ENCOUNTER
Pt is calling in about a prescription he received from an ED. Pt is requesting a refill. Pt does not have a PCP. Pt was advised to return to the facility to discuss with the doctor that prescribed the medication. Pt verbalized understanding.    Bridger Cuellar RN on 6/7/2023 at 5:39 PM      Reason for Disposition    Caller requesting a renewal or refill of a medicine patient is currently taking    Nursing judgment    Additional Information    Negative: [1] Intentional drug overdose AND [2] suicidal thoughts or ideas    Negative: Drug overdose and triager unable to answer question    Negative: Nursing judgment    Negative: Information only call; adult is not ill or injured    Negative: Nursing judgment    Negative: Nursing judgment    Negative: Nursing judgment    Negative: Nursing judgment    Negative: Nursing judgment    Negative: Nursing judgment    Negative: Nursing judgment    Negative: Nursing judgment    Negative: Nursing judgment    Negative: Nursing judgment    Negative: Nursing judgment    Protocols used: MEDICATION QUESTION CALL-A-, NO GUIDELINE OR REFERENCE [id number]

## 2023-06-17 ENCOUNTER — APPOINTMENT (OUTPATIENT)
Dept: CT IMAGING | Facility: CLINIC | Age: 23
End: 2023-06-17
Attending: EMERGENCY MEDICINE
Payer: COMMERCIAL

## 2023-06-17 ENCOUNTER — APPOINTMENT (OUTPATIENT)
Dept: GENERAL RADIOLOGY | Facility: CLINIC | Age: 23
End: 2023-06-17
Attending: EMERGENCY MEDICINE
Payer: COMMERCIAL

## 2023-06-17 ENCOUNTER — HOSPITAL ENCOUNTER (EMERGENCY)
Facility: CLINIC | Age: 23
Discharge: HOME | End: 2023-06-17
Attending: EMERGENCY MEDICINE | Admitting: EMERGENCY MEDICINE
Payer: COMMERCIAL

## 2023-06-17 VITALS
HEART RATE: 92 BPM | SYSTOLIC BLOOD PRESSURE: 108 MMHG | WEIGHT: 134.7 LBS | RESPIRATION RATE: 18 BRPM | OXYGEN SATURATION: 97 % | BODY MASS INDEX: 18.86 KG/M2 | HEIGHT: 71 IN | DIASTOLIC BLOOD PRESSURE: 71 MMHG | TEMPERATURE: 99 F
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
## 2023-06-17 DIAGNOSIS — S01.81XA FACIAL LACERATION, INITIAL ENCOUNTER: ICD-10-CM

## 2023-06-17 LAB
ABO/RH(D): NORMAL
ANION GAP SERPL CALCULATED.3IONS-SCNC: 10 MMOL/L (ref 7–15)
ANTIBODY SCREEN: NEGATIVE
BASOPHILS # BLD AUTO: 0.1 10E3/UL (ref 0–0.2)
BASOPHILS NFR BLD AUTO: 0 %
BUN SERPL-MCNC: 16.6 MG/DL (ref 6–20)
CALCIUM SERPL-MCNC: 9.2 MG/DL (ref 8.6–10)
CHLORIDE SERPL-SCNC: 101 MMOL/L (ref 98–107)
CREAT SERPL-MCNC: 1.24 MG/DL (ref 0.67–1.17)
DEPRECATED HCO3 PLAS-SCNC: 29 MMOL/L (ref 22–29)
EOSINOPHIL # BLD AUTO: 0.3 10E3/UL (ref 0–0.7)
EOSINOPHIL NFR BLD AUTO: 2 %
ERYTHROCYTE [DISTWIDTH] IN BLOOD BY AUTOMATED COUNT: 13.4 % (ref 10–15)
GFR SERPL CREATININE-BSD FRML MDRD: 84 ML/MIN/1.73M2
GLUCOSE SERPL-MCNC: 104 MG/DL (ref 70–99)
HCT VFR BLD AUTO: 42.4 % (ref 40–53)
HGB BLD-MCNC: 13.8 G/DL (ref 13.3–17.7)
IMM GRANULOCYTES # BLD: 0.1 10E3/UL
IMM GRANULOCYTES NFR BLD: 0 %
LYMPHOCYTES # BLD AUTO: 1.6 10E3/UL (ref 0.8–5.3)
LYMPHOCYTES NFR BLD AUTO: 12 %
MCH RBC QN AUTO: 28.8 PG (ref 26.5–33)
MCHC RBC AUTO-ENTMCNC: 32.5 G/DL (ref 31.5–36.5)
MCV RBC AUTO: 88 FL (ref 78–100)
MONOCYTES # BLD AUTO: 0.9 10E3/UL (ref 0–1.3)
MONOCYTES NFR BLD AUTO: 7 %
NEUTROPHILS # BLD AUTO: 10.5 10E3/UL (ref 1.6–8.3)
NEUTROPHILS NFR BLD AUTO: 79 %
NRBC # BLD AUTO: 0 10E3/UL
NRBC BLD AUTO-RTO: 0 /100
PLATELET # BLD AUTO: 290 10E3/UL (ref 150–450)
POTASSIUM SERPL-SCNC: 3.9 MMOL/L (ref 3.4–5.3)
RBC # BLD AUTO: 4.8 10E6/UL (ref 4.4–5.9)
SODIUM SERPL-SCNC: 140 MMOL/L (ref 136–145)
SPECIMEN EXPIRATION DATE: NORMAL
WBC # BLD AUTO: 13.5 10E3/UL (ref 4–11)

## 2023-06-17 PROCEDURE — 99285 EMERGENCY DEPT VISIT HI MDM: CPT | Mod: 25 | Performed by: EMERGENCY MEDICINE

## 2023-06-17 PROCEDURE — 86901 BLOOD TYPING SEROLOGIC RH(D): CPT | Performed by: EMERGENCY MEDICINE

## 2023-06-17 PROCEDURE — 250N000011 HC RX IP 250 OP 636: Performed by: EMERGENCY MEDICINE

## 2023-06-17 PROCEDURE — 70450 CT HEAD/BRAIN W/O DYE: CPT

## 2023-06-17 PROCEDURE — 86850 RBC ANTIBODY SCREEN: CPT | Performed by: EMERGENCY MEDICINE

## 2023-06-17 PROCEDURE — 85025 COMPLETE CBC W/AUTO DIFF WBC: CPT | Performed by: EMERGENCY MEDICINE

## 2023-06-17 PROCEDURE — 74177 CT ABD & PELVIS W/CONTRAST: CPT

## 2023-06-17 PROCEDURE — 99285 EMERGENCY DEPT VISIT HI MDM: CPT | Performed by: EMERGENCY MEDICINE

## 2023-06-17 PROCEDURE — 73030 X-RAY EXAM OF SHOULDER: CPT | Mod: LT

## 2023-06-17 PROCEDURE — 250N000013 HC RX MED GY IP 250 OP 250 PS 637: Performed by: EMERGENCY MEDICINE

## 2023-06-17 PROCEDURE — 82310 ASSAY OF CALCIUM: CPT | Performed by: EMERGENCY MEDICINE

## 2023-06-17 PROCEDURE — 72125 CT NECK SPINE W/O DYE: CPT

## 2023-06-17 PROCEDURE — 250N000009 HC RX 250: Performed by: EMERGENCY MEDICINE

## 2023-06-17 PROCEDURE — 250N000009 HC RX 250

## 2023-06-17 PROCEDURE — 70498 CT ANGIOGRAPHY NECK: CPT

## 2023-06-17 PROCEDURE — 70486 CT MAXILLOFACIAL W/O DYE: CPT

## 2023-06-17 PROCEDURE — 36415 COLL VENOUS BLD VENIPUNCTURE: CPT | Performed by: EMERGENCY MEDICINE

## 2023-06-17 RX ORDER — OXYCODONE HYDROCHLORIDE 5 MG/1
5 TABLET ORAL ONCE
Status: COMPLETED | OUTPATIENT
Start: 2023-06-17 | End: 2023-06-17

## 2023-06-17 RX ORDER — IOPAMIDOL 755 MG/ML
100 INJECTION, SOLUTION INTRAVASCULAR ONCE
Status: COMPLETED | OUTPATIENT
Start: 2023-06-17 | End: 2023-06-17

## 2023-06-17 RX ORDER — OXYCODONE HYDROCHLORIDE 5 MG/1
5 TABLET ORAL EVERY 6 HOURS PRN
Qty: 12 TABLET | Refills: 0 | Status: SHIPPED | OUTPATIENT
Start: 2023-06-17 | End: 2023-06-20

## 2023-06-17 RX ORDER — ACETAMINOPHEN 500 MG
1000 TABLET ORAL ONCE
Status: COMPLETED | OUTPATIENT
Start: 2023-06-17 | End: 2023-06-17

## 2023-06-17 RX ORDER — CEPHALEXIN 500 MG/1
500 CAPSULE ORAL 4 TIMES DAILY
Qty: 28 CAPSULE | Refills: 0 | Status: SHIPPED | OUTPATIENT
Start: 2023-06-17 | End: 2023-06-24

## 2023-06-17 RX ORDER — BUPIVACAINE HYDROCHLORIDE 5 MG/ML
INJECTION, SOLUTION EPIDURAL; INTRACAUDAL
Status: COMPLETED
Start: 2023-06-17 | End: 2023-06-17

## 2023-06-17 RX ADMIN — SODIUM CHLORIDE 80 ML: 9 INJECTION, SOLUTION INTRAVENOUS at 05:25

## 2023-06-17 RX ADMIN — BUPIVACAINE HYDROCHLORIDE: 5 INJECTION, SOLUTION EPIDURAL; INTRACAUDAL; PERINEURAL at 05:50

## 2023-06-17 RX ADMIN — ACETAMINOPHEN 1000 MG: 500 TABLET ORAL at 05:24

## 2023-06-17 RX ADMIN — OXYCODONE HYDROCHLORIDE 5 MG: 5 TABLET ORAL at 05:24

## 2023-06-17 RX ADMIN — IOPAMIDOL 75 ML: 755 INJECTION, SOLUTION INTRAVENOUS at 05:24

## 2023-06-17 ASSESSMENT — ACTIVITIES OF DAILY LIVING (ADL)
ADLS_ACUITY_SCORE: 35
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
## 2023-06-17 NOTE — DISCHARGE INSTRUCTIONS
Return to the emergency department for any worsening redness, swelling, drainage, pain, fever or any other concerns as given or discussed.     Please see your primary care doctor or urgent care within 5 days for suture removal.    Please return to the Ed for any worsening headache, change in vision, difficulty walking, numbness or tingling, confusion, rashes, fever, change in vision or any other concerns as given or discussed.     Return to the emergency department immediately for any chest pain, back pain, shortness of breath, weakness, abdominal pain nausea, vomiting, or any other concerns as given or discussed    You can take tylenol as needed for pain. The maximum daily dose is 4000 mg and the maximum single dose at a time is 1000mg. For your condition, your should take 1000mg every 6 hours as needed for pain.    You can take ibuprofen for pain. The maximum daily dose is 2400 mg and the maximum single dose as a time is 800mg. For your condition, your should take 600mg every 4 hours as needed for pain.
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
## 2023-07-03 ENCOUNTER — OFFICE VISIT (OUTPATIENT)
Dept: URGENT CARE | Facility: URGENT CARE | Age: 23
End: 2023-07-03
Payer: COMMERCIAL

## 2023-07-03 VITALS
RESPIRATION RATE: 16 BRPM | TEMPERATURE: 97 F | HEIGHT: 71 IN | SYSTOLIC BLOOD PRESSURE: 118 MMHG | HEART RATE: 82 BPM | DIASTOLIC BLOOD PRESSURE: 80 MMHG | OXYGEN SATURATION: 97 % | WEIGHT: 134 LBS | BODY MASS INDEX: 18.76 KG/M2

## 2023-07-03 DIAGNOSIS — L08.9 INFECTED ABRASION OF LEFT FOOT, INITIAL ENCOUNTER: ICD-10-CM

## 2023-07-03 DIAGNOSIS — S06.0X0A CONCUSSION WITHOUT LOSS OF CONSCIOUSNESS, INITIAL ENCOUNTER: ICD-10-CM

## 2023-07-03 DIAGNOSIS — F41.9 ANXIETY: ICD-10-CM

## 2023-07-03 DIAGNOSIS — S70.219A: Primary | ICD-10-CM

## 2023-07-03 DIAGNOSIS — S90.812A INFECTED ABRASION OF LEFT FOOT, INITIAL ENCOUNTER: ICD-10-CM
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
## 2023-07-03 PROCEDURE — 99213 OFFICE O/P EST LOW 20 MIN: CPT | Performed by: PHYSICIAN ASSISTANT

## 2023-07-03 RX ORDER — CEPHALEXIN 500 MG/1
500 CAPSULE ORAL 2 TIMES DAILY
Qty: 20 CAPSULE | Refills: 0 | Status: SHIPPED | OUTPATIENT
Start: 2023-07-03 | End: 2023-07-13

## 2023-07-03 RX ORDER — LIDOCAINE 40 MG/G
CREAM TOPICAL
Qty: 60 G | Refills: 0 | Status: SHIPPED | OUTPATIENT
Start: 2023-07-03
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
## 2023-07-03 NOTE — NURSING NOTE
Camelia Tompkins PA-C to remove sutures. Sutures removed, wound was well healed.  Tish Fernandes, CMA

## 2023-11-26 ENCOUNTER — HEALTH MAINTENANCE LETTER (OUTPATIENT)
Age: 23
End: 2023-11-26

## 2024-01-29 ENCOUNTER — OFFICE VISIT (OUTPATIENT)
Dept: URGENT CARE | Facility: URGENT CARE | Age: 24
End: 2024-01-29
Payer: COMMERCIAL

## 2024-01-29 VITALS
BODY MASS INDEX: 18.2 KG/M2 | OXYGEN SATURATION: 96 % | DIASTOLIC BLOOD PRESSURE: 71 MMHG | HEART RATE: 63 BPM | TEMPERATURE: 98.3 F | RESPIRATION RATE: 16 BRPM | WEIGHT: 130.5 LBS | SYSTOLIC BLOOD PRESSURE: 121 MMHG

## 2024-01-29 DIAGNOSIS — B35.1 FUNGAL INFECTION OF TOENAIL: ICD-10-CM

## 2024-01-29 DIAGNOSIS — J45.40 MODERATE PERSISTENT ASTHMA, UNSPECIFIED WHETHER COMPLICATED: Primary | ICD-10-CM

## 2024-01-29 DIAGNOSIS — G89.29 CHRONIC LEFT-SIDED LOW BACK PAIN WITHOUT SCIATICA: ICD-10-CM

## 2024-01-29 DIAGNOSIS — M54.50 CHRONIC LEFT-SIDED LOW BACK PAIN WITHOUT SCIATICA: ICD-10-CM

## 2024-01-29 PROCEDURE — 99213 OFFICE O/P EST LOW 20 MIN: CPT

## 2024-01-29 RX ORDER — NAPROXEN 500 MG/1
500 TABLET ORAL 2 TIMES DAILY WITH MEALS
Qty: 60 TABLET | Refills: 0 | Status: SHIPPED | OUTPATIENT
Start: 2024-01-29

## 2024-01-29 RX ORDER — KETOCONAZOLE 20 MG/G
CREAM TOPICAL DAILY
Qty: 60 G | Refills: 0 | Status: SHIPPED | OUTPATIENT
Start: 2024-01-29

## 2024-01-29 RX ORDER — FLUTICASONE PROPIONATE 44 UG/1
1 AEROSOL, METERED RESPIRATORY (INHALATION) 2 TIMES DAILY
Qty: 10.6 G | Refills: 0 | Status: SHIPPED | OUTPATIENT
Start: 2024-01-29

## 2024-01-29 RX ORDER — ALBUTEROL SULFATE 90 UG/1
2 AEROSOL, METERED RESPIRATORY (INHALATION) EVERY 6 HOURS PRN
Qty: 8.5 G | Refills: 0 | Status: SHIPPED | OUTPATIENT
Start: 2024-01-29

## 2024-01-29 NOTE — PROGRESS NOTES
Assessment & Plan   (J45.40) Moderate persistent asthma, unspecified whether complicated  (primary encounter diagnosis)  Plan: albuterol (PROAIR HFA/PROVENTIL HFA/VENTOLIN         HFA) 108 (90 Base) MCG/ACT inhaler, fluticasone        (FLOVENT HFA) 44 MCG/ACT inhaler    (M54.50,  G89.29) Chronic left-sided low back pain without sciatica  Plan: naproxen (NAPROSYN) 500 MG tablet    (B35.1) Fungal infection of toenail  Plan: ketoconazole (NIZORAL) 2 % external cream    Informed the patient that the albuterol and Flovent inhalers have been refilled for him today.  We discussed the need to follow-up with his primary care provider for an asthma recheck and for future refills.  Ketoconazole was prescribed again for the patient and he was informed to utilize this as prescribed.  We discussed taking naproxen with food as prescribed for his chronic back pain and following up with his primary care provider next month as scheduled.  Patient acknowledged his understanding of the above plan.    The use of Dragon[employer] dictation services may have been used to construct the content in this note; any grammatical or spelling errors are non-intentional. Please contact the author of this note directly if you are in need of any clarification.      SAKINA Gonzalez CNP  [facility] URGENT C[facility]    Wallace Song is a 23 year old male who presents to clinic today for the following health issues:  Chief Complaint   Patient presents with    Urgent Care    Finger     Stating that both thumbs not the same especially Right thumb, stating seen something underneath the nail    Back Pain    Refill Request     Inhaler      HPI  Patient indicates that he no longer has his albuterol or Flovent inhalers for his asthma and is requesting a refill.  He also states that he has chronic back pain and would like a prescription for naproxen.  In addition, he indicates that he lost his ketoconazole cream for his  toenail and since the fungal infection has not resolved; he is requesting a new tube of the cream.    ROS:  Negative except noted above.    Review of Systems        Objective    /71 (BP Location: Left arm, Patient Position: Sitting, Cuff Size: Adult Regular)   Pulse 63   Temp 98.3  F (36.8  C) (Tympanic)   Resp 16   Wt 59.2 kg (130 lb 8 oz)   SpO2 96%   BMI 18.20 kg/m    Physical Exam   GENERAL: alert and no distress  EYES: Eyes grossly normal to inspection, PERRL and conjunctivae and sclerae normal  HENT: nose and mouth without ulcers or lesions, oropharynx clear, and oral mucous membranes moist  RESP: lungs clear to auscultation - no rales, rhonchi or wheezes  CV: regular rate and rhythm, normal S1 S2, no S3 or S4, no murmur, click or rub, no peripheral edema  MS: no gross musculoskeletal defects noted, no edema  SKIN: Right great toenail yellow  NEURO: Normal strength and tone, mentation intact and speech normal  PSYCH: mentation appears normal, affect normal/bright

## 2024-01-29 NOTE — PATIENT INSTRUCTIONS
Albuterol and Flovent inhalers refilled for you today.  Follow-up with your primary care provider for an asthma recheck and for future refills.  Use the ketoconazole as prescribed.  Take naproxen with food as prescribed for your chronic back pain.  Follow-up with your primary care provider next month as scheduled.

## 2024-02-02 ENCOUNTER — TELEPHONE (OUTPATIENT)
Dept: URGENT CARE | Facility: URGENT CARE | Age: 24
End: 2024-02-02
Payer: COMMERCIAL

## 2024-02-15 NOTE — TELEPHONE ENCOUNTER
Retail Pharmacy Prior Authorization Team   Phone: [phone]    PA Initiation    Medication: FLUTICASONE PROPIONATE HFA 44 MCG/ACT IN AERO  Insurance Company: [employer] - Phone [phone] Fax [phone]  Pharmacy Filling the Rx: [id number] - [place], [place] - [address] AT [facility] OF [address]  Filling Pharmacy Phone: [phone]  Filling Pharmacy Fax:    Start Date: 2/15/2024

## 2024-02-19 NOTE — TELEPHONE ENCOUNTER
Prior Authorization Approval    Medication: FLUTICASONE PROPIONATE HFA 44 MCG/ACT IN AERO  Authorization Effective Date: 2/15/2024  Authorization Expiration Date: 3/31/2024  Approved Dose/Quantity:   Reference #:     Insurance Company: [employer] - Phone [phone] Fax [phone]  Expected CoPay: $    CoPay Card Available:      Financial Assistance Needed:   Which Pharmacy is filling the prescription: [id number] [place] [address] AT [facility] OF [address] [place] [address]  Pharmacy Notified: Yes  Patient Notified: **Instructed pharmacy to notify patient when script is ready to /ship.**    Notified with approval by Eloisa from [employer].

## 2024-03-04 DIAGNOSIS — J30.2 SEASONAL ALLERGIC RHINITIS, UNSPECIFIED TRIGGER: ICD-10-CM

## 2024-03-04 DIAGNOSIS — J45.909 UNCOMPLICATED ASTHMA, UNSPECIFIED ASTHMA SEVERITY, UNSPECIFIED WHETHER PERSISTENT: ICD-10-CM

## 2024-03-04 RX ORDER — ALBUTEROL SULFATE 90 UG/1
1-2 AEROSOL, METERED RESPIRATORY (INHALATION) EVERY 6 HOURS
Qty: 18 G | Refills: 1 | OUTPATIENT
Start: 2024-03-04

## 2024-03-20 DIAGNOSIS — M54.50 CHRONIC LEFT-SIDED LOW BACK PAIN WITHOUT SCIATICA: ICD-10-CM

## 2024-03-20 DIAGNOSIS — G89.29 CHRONIC LEFT-SIDED LOW BACK PAIN WITHOUT SCIATICA: ICD-10-CM

## 2024-03-20 RX ORDER — NAPROXEN 500 MG/1
500 TABLET ORAL 2 TIMES DAILY WITH MEALS
Qty: 60 TABLET | Refills: 0 | OUTPATIENT
Start: 2024-03-20

## 2024-03-21 ENCOUNTER — OFFICE VISIT (OUTPATIENT)
Dept: URGENT CARE | Facility: URGENT CARE | Age: 24
End: 2024-03-21
Payer: COMMERCIAL

## 2024-03-21 VITALS
HEART RATE: 88 BPM | OXYGEN SATURATION: 98 % | BODY MASS INDEX: 18.55 KG/M2 | TEMPERATURE: 98.6 F | WEIGHT: 133 LBS | DIASTOLIC BLOOD PRESSURE: 74 MMHG | SYSTOLIC BLOOD PRESSURE: 104 MMHG

## 2024-03-21 DIAGNOSIS — R68.83 CHILLS: ICD-10-CM

## 2024-03-21 DIAGNOSIS — G89.29 CHRONIC LEFT-SIDED THORACIC BACK PAIN: ICD-10-CM

## 2024-03-21 DIAGNOSIS — M54.6 CHRONIC LEFT-SIDED THORACIC BACK PAIN: ICD-10-CM

## 2024-03-21 DIAGNOSIS — J10.1 INFLUENZA B: Primary | ICD-10-CM

## 2024-03-21 LAB
FLUAV AG SPEC QL IA: NEGATIVE
FLUBV AG SPEC QL IA: POSITIVE

## 2024-03-21 PROCEDURE — 87804 INFLUENZA ASSAY W/OPTIC: CPT | Performed by: STUDENT IN AN ORGANIZED HEALTH CARE EDUCATION/TRAINING PROGRAM

## 2024-03-21 PROCEDURE — 99213 OFFICE O/P EST LOW 20 MIN: CPT | Performed by: STUDENT IN AN ORGANIZED HEALTH CARE EDUCATION/TRAINING PROGRAM

## 2024-03-21 RX ORDER — OSELTAMIVIR PHOSPHATE 75 MG/1
75 CAPSULE ORAL 2 TIMES DAILY
Qty: 10 CAPSULE | Refills: 0 | Status: SHIPPED | OUTPATIENT
Start: 2024-03-21 | End: 2024-03-26

## 2024-03-21 RX ORDER — CYCLOBENZAPRINE HCL 10 MG
10 TABLET ORAL 3 TIMES DAILY PRN
Qty: 30 TABLET | Refills: 0 | Status: SHIPPED | OUTPATIENT
Start: 2024-03-21
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
## 2024-03-22 NOTE — TELEPHONE ENCOUNTER
Records Requested     March 22, 2024 10:11 AM   [id number]   Facility  [facility]   Outcome 10:13 am Sent request for imaging to be pushed to PACS. -CYNTHIA Paulamaurilio Benjamin on 3/25/2024 at 7:39 AM Imaging resolved into PACS. -CYNTHIA     SPINE PATIENTS - NEW PROTOCOL PREVISIT    RECORDS RECEIVED FROM: Care Everywhere   REASON FOR VISIT: Chronic left-sided thoracic back pain   PROVIDER: Yuliya Mcgill PA-C   DATE OF APPT: 3/26/24 @ 9:00 am    NOTES (FOR ALL VISITS) STATUS DETAILS   OFFICE NOTE from referring provider Internal 3/21/24 Bonny Thomas PA-C @[facility]     OFFICE NOTE from other specialist Care Everywhere 1/29/24 Jamison Mahoney APRN CNP @[facility]    9/13/22 Rashida Downey PA-C @[facility]    9/4/20 Pepito Campoverde MD @[facility]    7/16/19 Johana Yarbrough APRN, CNP  @[facility]     DISCHARGE REPORT from ER Care Everywhere 5/17/23 Aravind [place] MD  @[facility] ED    4/27/23 Charles Santiago MD @[facility] ED    4/27/23 Srini Palencia DO [address] [place] [address] ED     MEDICATION LIST Internal    IMAGING  (FOR ALL VISITS)     XRAY (SPINE) *NEUROSURGERY* PACS [facility]  5/17/23 XR Thoracic Spine    [facility]  4/27/23 XR Lumbar Spine  9/13/22 XR Thoracic Spine  9/4/20 XR Lumbar Spine  12/22/18 XR Cervical Spine     CT (HEAD, NECK, SPINE) Internal [facility]  6/17/23 CTA Neck  6/17/23 CT Cervical Spine

## 2024-03-24 DIAGNOSIS — G89.29 CHRONIC LEFT-SIDED THORACIC BACK PAIN: Primary | ICD-10-CM

## 2024-03-24 DIAGNOSIS — M54.6 CHRONIC LEFT-SIDED THORACIC BACK PAIN: Primary | ICD-10-CM

## 2024-03-25 ENCOUNTER — TELEPHONE (OUTPATIENT)
Dept: NEUROSURGERY | Facility: CLINIC | Age: 24
End: 2024-03-25
Payer: COMMERCIAL

## 2024-03-26 ENCOUNTER — PRE VISIT (OUTPATIENT)
Dept: NEUROSURGERY | Facility: CLINIC | Age: 24
End: 2024-03-26

## 2024-04-01 DIAGNOSIS — M54.50 CHRONIC LEFT-SIDED LOW BACK PAIN WITHOUT SCIATICA: ICD-10-CM

## 2024-04-01 DIAGNOSIS — G89.29 CHRONIC LEFT-SIDED LOW BACK PAIN WITHOUT SCIATICA: ICD-10-CM

## 2024-04-01 RX ORDER — NAPROXEN 500 MG/1
500 TABLET ORAL 2 TIMES DAILY WITH MEALS
Qty: 60 TABLET | Refills: 0 | OUTPATIENT
Start: 2024-04-01

## 2025-01-04 ENCOUNTER — HEALTH MAINTENANCE LETTER (OUTPATIENT)
Age: 25
End: 2025-01-04

## 2025-01-21 ENCOUNTER — OFFICE VISIT (OUTPATIENT)
Dept: URGENT CARE | Facility: URGENT CARE | Age: 25
End: 2025-01-21
Payer: COMMERCIAL

## 2025-01-21 VITALS
HEART RATE: 64 BPM | WEIGHT: 132 LBS | TEMPERATURE: 98.3 F | SYSTOLIC BLOOD PRESSURE: 114 MMHG | DIASTOLIC BLOOD PRESSURE: 66 MMHG | OXYGEN SATURATION: 98 % | BODY MASS INDEX: 18.41 KG/M2 | RESPIRATION RATE: 16 BRPM

## 2025-01-21 DIAGNOSIS — J06.9 VIRAL UPPER RESPIRATORY INFECTION: Primary | ICD-10-CM

## 2025-01-21 DIAGNOSIS — R05.1 ACUTE COUGH: ICD-10-CM

## 2025-01-21 LAB
FLUAV AG SPEC QL IA: NEGATIVE
FLUBV AG SPEC QL IA: NEGATIVE

## 2025-01-21 PROCEDURE — 87804 INFLUENZA ASSAY W/OPTIC: CPT | Performed by: PHYSICIAN ASSISTANT

## 2025-01-21 PROCEDURE — 99213 OFFICE O/P EST LOW 20 MIN: CPT | Performed by: PHYSICIAN ASSISTANT

## 2025-01-22 NOTE — PROGRESS NOTES
Chief Complaint   Patient presents with    Cough     Cough, congestion beginning yesterday.            Results for orders placed or performed in visit on 25   Influenza A/B antigen     Status: Normal    Specimen: Nose; Swab   Result Value Ref Range    Influenza A antigen Negative Negative    Influenza B antigen Negative Negative    Narrative    Test results must be correlated with clinical data. If necessary, results should be confirmed by a molecular assay or viral culture.           ASSESSMENT:     ICD-10-CM    1. Viral upper respiratory infection  J06.9       2. Acute cough  R05.1 Influenza A/B antigen            PLAN: Viral respiratory infection.  I have discussed clinical findings with patient.  Symptomatic care is discussed.  I have discussed the possibility of  worsening symptoms and indication to RTC or go to the ER if they occur.  All questions are answered, patient indicates understanding of these issues and is in agreement with plan.   Patient care instructions are discussed/given at the end of visit.   Lots of rest and fluids.      Rashida Downye PA-C      SUBJECTIVE:  24-year-old male presents for cough and nasal congestion that started yesterday.  No fever.  No vomiting or diarrhea.  No rash.  No sore throat.  Thin  are also sick with cough.    Allergies   Allergen Reactions    Cats      Rash, itching, cough, nasal congestion      Dust Mites        Past Medical History:   Diagnosis Date    Asthma exacerbation 10/11/2013    Hospitalized 10/11-10/14/13    Asthma exacerbation     Hospitalized -8/25/15 - PICU    Asthma exacerbation     Hospitalized 16 - PICU    Constipation     required enema in     Moderate persistent asthma 2008    Hospitalized -08 at [facility] with acute exacerbation.      Moderate persistent asthma 2008    Hospitalized -2009    Moderate persistent asthma     Hospitalized 2/       Current Outpatient Medications    Medication Sig Dispense Refill    albuterol (PROAIR HFA/PROVENTIL HFA/VENTOLIN HFA) 108 (90 Base) MCG/ACT inhaler Inhale 2 puffs into the lungs every 6 hours as needed for wheezing, shortness of breath or cough 8.5 g 0    albuterol (PROAIR HFA/PROVENTIL HFA/VENTOLIN HFA) 108 (90 Base) MCG/ACT inhaler Inhale 1-2 puffs into the lungs every 6 hours (Patient not taking: Reported on 1/29/2024) 18 g 1    albuterol (PROAIR RESPICLICK) 108 (90 Base) MCG/ACT inhaler Inhale 1-2 puffs into the lungs every 4 hours as needed for shortness of breath / dyspnea or wheezing (Patient not taking: Reported on 1/29/2024) 1 Inhaler 0    cyclobenzaprine (FLEXERIL) 10 MG tablet Take 1 tablet (10 mg) by mouth 3 times daily as needed for muscle spasms 30 tablet 0    cyclobenzaprine (FLEXERIL) 10 MG tablet Take 1 tablet (10 mg) by mouth 2 times daily as needed for muscle spasms (Patient not taking: Reported on 1/29/2024) 15 tablet 0    cyclobenzaprine (FLEXERIL) 10 MG tablet Take 1 tablet (10 mg) by mouth 3 times daily as needed for muscle spasms (Patient not taking: Reported on 1/29/2024) 30 tablet 0    fluticasone (FLOVENT HFA) 44 MCG/ACT inhaler Inhale 1 puff into the lungs 2 times daily 10.6 g 0    fluticasone (FLOVENT HFA) 44 MCG/ACT inhaler Inhale 1 puff into the lungs 2 times daily (Patient not taking: Reported on 1/29/2024) 10.6 g 0    ibuprofen (ADVIL/MOTRIN) 600 MG tablet Take 1 tablet (600 mg) by mouth every 6 hours as needed for moderate pain (4-6) (Patient not taking: Reported on 1/29/2024) 30 tablet 0    ipratropium - albuterol 0.5 mg/2.5 mg/3 mL (DUONEB) 0.5-2.5 (3) MG/3ML neb solution Take 1 vial (3 mLs) by nebulization every 4 hours as needed for shortness of breath / dyspnea or wheezing (Patient not taking: Reported on 1/29/2024) 90 mL 0    ketoconazole (NIZORAL) 2 % external cream Apply topically daily 60 g 0    lidocaine (LMX4) 4 % external cream Apply topically once as needed for mild pain (Patient not taking: Reported  on 1/29/2024) 60 g 0    methocarbamol (ROBAXIN) 500 MG tablet Take 1 tablet (500 mg) by mouth 4 times daily as needed for muscle spasms (back pain) (Patient not taking: Reported on 1/29/2024) 30 tablet 0    naproxen (NAPROSYN) 500 MG tablet Take 1 tablet (500 mg) by mouth 2 times daily (with meals) 60 tablet 0    naproxen (NAPROSYN) 500 MG tablet Take 1 tablet (500 mg) by mouth 2 times daily (with meals) (Patient not taking: Reported on 1/29/2024) 20 tablet 0    omeprazole (PRILOSEC) 20 MG DR capsule Take 1 capsule (20 mg) by mouth daily (Patient not taking: Reported on 1/29/2024) 14 capsule 0    ondansetron (ZOFRAN ODT) 4 MG ODT tab Take 1 tablet (4 mg) by mouth every 8 hours as needed for nausea (Patient not taking: Reported on 1/29/2024) 15 tablet 0    ondansetron (ZOFRAN ODT) 4 MG ODT tab Place 8 mg under the tongue (Patient not taking: Reported on 1/29/2024)      tiZANidine (ZANAFLEX) 4 MG tablet Take 1 tablet (4 mg) by mouth 3 times daily (Patient not taking: Reported on 1/29/2024) 30 tablet 0     No current facility-administered medications for this visit.       Social History     Tobacco Use    Smoking status: Former     Types: Vaping Device    Smokeless tobacco: Never    Tobacco comments:     mom quit   Substance Use Topics    Alcohol use: No     Alcohol/week: 0.0 standard drinks of alcohol       ROS:  CONSTITUTIONAL: Negative for fatigue or fever.  EYES: Negative for eye problems.  ENT: As above.  RESP: As above.  GI: Negative for vomiting.  NEUROLOGIC: Negative for headaches.  SKIN: Negative for rash.  PSYCH: Normal mentation for age.    OBJECTIVE:  /66 (BP Location: Left arm, Patient Position: Sitting, Cuff Size: Adult Regular)   Pulse 64   Temp 98.3  F (36.8  C) (Oral)   Resp 16   Wt 59.9 kg (132 lb)   SpO2 98%   BMI 18.41 kg/m    GENERAL APPEARANCE: Healthy, alert and no distress.  EYES:Conjunctiva/sclera clear.  EARS: No cerumen.   Ear canals w/o erythema.  TM's intact w/o erythema.     THROAT: No erythema w/o tonsillar enlargement . No exudates.  NECK: Supple, nontender, no lymphadenopathy.  RESP: Lungs clear to auscultation - no rales, rhonchi or wheezes  CV: Regular rate and rhythm, normal S1 S2, no murmur noted.  NEURO: Awake, alert    SKIN: No rashes      Rashida Downey PA-C

## 2025-03-12 ENCOUNTER — OFFICE VISIT (OUTPATIENT)
Dept: FAMILY MEDICINE | Facility: CLINIC | Age: 25
End: 2025-03-12
Payer: COMMERCIAL

## 2025-03-12 VITALS
SYSTOLIC BLOOD PRESSURE: 112 MMHG | HEART RATE: 63 BPM | DIASTOLIC BLOOD PRESSURE: 74 MMHG | WEIGHT: 130 LBS | OXYGEN SATURATION: 98 % | TEMPERATURE: 98.4 F | BODY MASS INDEX: 18.13 KG/M2

## 2025-03-12 DIAGNOSIS — Z11.4 SCREENING FOR HIV (HUMAN IMMUNODEFICIENCY VIRUS): Primary | ICD-10-CM

## 2025-03-12 DIAGNOSIS — M54.6 CHRONIC LEFT-SIDED THORACIC BACK PAIN: ICD-10-CM

## 2025-03-12 DIAGNOSIS — G89.29 CHRONIC LEFT-SIDED THORACIC BACK PAIN: ICD-10-CM

## 2025-03-12 DIAGNOSIS — K21.9 GASTROESOPHAGEAL REFLUX DISEASE, UNSPECIFIED WHETHER ESOPHAGITIS PRESENT: ICD-10-CM

## 2025-03-12 DIAGNOSIS — J45.40 MODERATE PERSISTENT ASTHMA, UNSPECIFIED WHETHER COMPLICATED: ICD-10-CM

## 2025-03-12 DIAGNOSIS — Z11.59 NEED FOR HEPATITIS C SCREENING TEST: ICD-10-CM

## 2025-03-12 DIAGNOSIS — G47.00 INSOMNIA, UNSPECIFIED TYPE: ICD-10-CM

## 2025-03-12 PROCEDURE — 99214 OFFICE O/P EST MOD 30 MIN: CPT | Performed by: FAMILY MEDICINE

## 2025-03-12 RX ORDER — HYDROXYZINE HYDROCHLORIDE 25 MG/1
25-50 TABLET, FILM COATED ORAL
Qty: 60 TABLET | Refills: 2 | Status: SHIPPED | OUTPATIENT
Start: 2025-03-12

## 2025-03-12 RX ORDER — OMEPRAZOLE 20 MG/1
20 CAPSULE, DELAYED RELEASE ORAL DAILY
Qty: 30 CAPSULE | Refills: 1 | Status: SHIPPED | OUTPATIENT
Start: 2025-03-12

## 2025-03-12 RX ORDER — PREDNISONE 20 MG/1
40 TABLET ORAL DAILY
Qty: 10 TABLET | Refills: 2 | Status: SHIPPED | OUTPATIENT
Start: 2025-03-12 | End: 2025-03-17

## 2025-03-12 RX ORDER — ALBUTEROL SULFATE 90 UG/1
2 INHALANT RESPIRATORY (INHALATION) EVERY 6 HOURS PRN
Qty: 8.5 G | Refills: 0 | Status: CANCELLED | OUTPATIENT
Start: 2025-03-12

## 2025-03-12 RX ORDER — ALBUTEROL SULFATE 90 UG/1
2 INHALANT RESPIRATORY (INHALATION) EVERY 6 HOURS PRN
Qty: 18 G | Refills: 4 | Status: SHIPPED | OUTPATIENT
Start: 2025-03-12

## 2025-03-12 RX ORDER — BUDESONIDE AND FORMOTEROL FUMARATE DIHYDRATE 160; 4.5 UG/1; UG/1
AEROSOL RESPIRATORY (INHALATION)
Qty: 20.4 G | Refills: 11 | Status: SHIPPED | OUTPATIENT
Start: 2025-03-12

## 2025-03-12 ASSESSMENT — ASTHMA QUESTIONNAIRES
ACT_TOTALSCORE: 22
QUESTION_4 LAST FOUR WEEKS HOW OFTEN HAVE YOU USED YOUR RESCUE INHALER OR NEBULIZER MEDICATION (SUCH AS ALBUTEROL): NOT AT ALL
ACT_TOTALSCORE: 22
QUESTION_5 LAST FOUR WEEKS HOW WOULD YOU RATE YOUR ASTHMA CONTROL: COMPLETELY CONTROLLED
QUESTION_3 LAST FOUR WEEKS HOW OFTEN DID YOUR ASTHMA SYMPTOMS (WHEEZING, COUGHING, SHORTNESS OF BREATH, CHEST TIGHTNESS OR PAIN) WAKE YOU UP AT NIGHT OR EARLIER THAN USUAL IN THE MORNING: ONCE OR TWICE
QUESTION_1 LAST FOUR WEEKS HOW MUCH OF THE TIME DID YOUR ASTHMA KEEP YOU FROM GETTING AS MUCH DONE AT WORK, SCHOOL OR AT HOME: A LITTLE OF THE TIME
QUESTION_2 LAST FOUR WEEKS HOW OFTEN HAVE YOU HAD SHORTNESS OF BREATH: ONCE OR TWICE A WEEK

## 2025-03-12 ASSESSMENT — PATIENT HEALTH QUESTIONNAIRE - PHQ9
SUM OF ALL RESPONSES TO PHQ QUESTIONS 1-9: 6
SUM OF ALL RESPONSES TO PHQ QUESTIONS 1-9: 6
10. IF YOU CHECKED OFF ANY PROBLEMS, HOW DIFFICULT HAVE THESE PROBLEMS MADE IT FOR YOU TO DO YOUR WORK, TAKE CARE OF THINGS AT HOME, OR GET ALONG WITH OTHER PEOPLE: EXTREMELY DIFFICULT

## 2025-03-12 ASSESSMENT — ENCOUNTER SYMPTOMS: BACK PAIN: 1

## 2025-03-12 NOTE — PATIENT INSTRUCTIONS
Possible viscerosomatic reflux from the esophagus to the left mid back     Adenike Lloyd is a good chiropractor   [phone]
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
## 2025-03-12 NOTE — LETTER
My Asthma Action Plan    Name: Duncan Dumont   YOB: 2000  Date: 3/12/2025   My doctor: Zana Calloway, DO   My clinic: [facility]        My Rescue Medicine:   Albuterol inhaler (Proair/Ventolin/Proventil HFA)  2-4 puffs EVERY 4 HOURS as needed. Use a spacer if recommended by your provider.   My Asthma Severity:   Intermittent / Exercise Induced  Know your asthma triggers: upper respiratory infections and cold air  cold air          GREEN ZONE   Good Control  I feel good  No cough or wheeze  Can work, sleep and play without asthma symptoms       Take your asthma control medicine every day.     If exercise triggers your asthma, take your rescue medication  15 minutes before exercise or sports, and  During exercise if you have asthma symptoms  Spacer to use with inhaler: If you have a spacer, make sure to use it with your inhaler             YELLOW ZONE Getting Worse  I have ANY of these:  I do not feel good  Cough or wheeze  Chest feels tight  Wake up at night   Keep taking your Green Zone medications  Start taking your rescue medicine:  every 20 minutes for up to 1 hour. Then every 4 hours for 24-48 hours.  Start taking symbicort 1-2 puffs ever 4 hours as needed   If you stay in the Yellow Zone for more than 12-24 hours, contact your doctor.  If you do not return to the Green Zone in 12-24 hours or you get worse, start taking your oral steroid medicine if prescribed by your provider.           RED ZONE Medical Alert - Get Help  I have ANY of these:  I feel awful  Medicine is not helping  Breathing getting harder  Trouble walking or talking  Nose opens wide to breathe       Take your rescue medicine NOW  If your provider has prescribed an oral steroid medicine, start taking it NOW  Call your doctor NOW  If you are still in the Red Zone after 20 minutes and you have not reached your doctor:  Take your rescue medicine again and  Call 911 or go to the emergency room right  away    See your regular doctor within 2 weeks of an Emergency Room or Urgent Care visit for follow-up treatment.          Annual Reminders:  Meet with Asthma Educator,  Flu Shot in the Fall, consider Pneumonia Vaccination for patients with asthma (aged 19 and older).    Pharmacy:    [facility] [address]  [id number] - [place], [place] - [address]  [id number] [address] [facility]  [facility][id number] - [place], [place] - [address]  [id number] [place], [place] - [address]    Electronically signed by Zana Calloway DO   Date: 03/12/25                    Asthma Triggers  How To Control Things That Make Your Asthma Worse    Triggers are things that make your asthma worse.  Look at the list below to help you find your triggers and   what you can do about them. You can help prevent asthma flare-ups by staying away from your triggers.      Trigger                                                          What you can do   Cigarette Smoke  Tobacco smoke can make asthma worse. Do not allow smoking in your home, car or around you.  Be sure no one smokes at a child s day care or school.  If you smoke, ask your health care provider for ways to help you quit.  Ask family members to quit too.  Ask your health care provider for a referral to Quit Plan to help you quit smoking, or call 0-103-241-PLAN.     Colds, Flu, Bronchitis  These are common triggers of asthma. Wash your hands often.  Don t touch your eyes, nose or mouth.  Get a flu shot every year.     Dust Mites  These are tiny bugs that live in cloth or carpet. They are too small to see. Wash sheets and blankets in hot water every week.   Encase pillows and mattress in dust mite proof covers.  Avoid having carpet if you can. If you have  carpet, vacuum weekly.   Use a dust mask and HEPA vacuum.   Pollen and Outdoor Mold  Some people are allergic to trees, grass, or weed pollen, or molds. Try to keep your windows closed.  Limit time out doors when pollen count is high.   Ask you health care provider about taking medicine during allergy season.     Animal Dander  Some people are allergic to skin flakes, urine or saliva from pets with fur or feathers. Keep pets with fur or feathers out of your home.    If you can t keep the pet outdoors, then keep the pet out of your bedroom.  Keep the bedroom door closed.  Keep pets off cloth furniture and away from stuffed toys.     Mice, Rats, and Cockroaches  Some people are allergic to the waste from these pests.   Cover food and garbage.  Clean up spills and food crumbs.  Store grease in the refrigerator.   Keep food out of the bedroom.   Indoor Mold  This can be a trigger if your home has high moisture. Fix leaking faucets, pipes, or other sources of water.   Clean moldy surfaces.  Dehumidify basement if it is damp and smelly.   Smoke, Strong Odors, and Sprays  These can reduce air quality. Stay away from strong odors and sprays, such as perfume, powder, hair spray, paints, smoke incense, paint, cleaning products, candles and new carpet.   Exercise or Sports  Some people with asthma have this trigger. Be active!  Ask your doctor about taking medicine before sports or exercise to prevent symptoms.    Warm up for 5-10 minutes before and after sports or exercise.     Other Triggers of Asthma  Cold air:  Cover your nose and mouth with a scarf.  Sometimes laughing or crying can be a trigger.  Some medicines and food can trigger asthma.

## 2025-03-13 ENCOUNTER — TELEPHONE (OUTPATIENT)
Dept: FAMILY MEDICINE | Facility: CLINIC | Age: 25
End: 2025-03-13

## 2025-03-13 DIAGNOSIS — J45.40 MODERATE PERSISTENT ASTHMA, UNSPECIFIED WHETHER COMPLICATED: ICD-10-CM

## 2025-03-17 NOTE — TELEPHONE ENCOUNTER
Central Prior Authorization Team   Phone: [phone]    PA Initiation    Medication: budesonide-formoterol (SYMBICORT/BREYNA) 160-4.5 MCG/ACT Inhaler  Insurance Company: [employer] - Phone [phone] Fax [phone]  Pharmacy Filling the Rx: [id number] [address] [place], [place] [address]  Filling Pharmacy Phone: [phone]  Filling Pharmacy Fax:    Start Date:

## 2025-03-18 NOTE — TELEPHONE ENCOUNTER
PRIOR AUTHORIZATION DENIED    Medication: budesonide-formoterol (SYMBICORT/BREYNA) 160-4.5 MCG/ACT Inhaler-PA QTY LIMIT DENIED     Denial Date: 3/17/2025    Denial Rational:           Appeal Information:

## 2025-03-25 RX ORDER — BUDESONIDE AND FORMOTEROL FUMARATE DIHYDRATE 160; 4.5 UG/1; UG/1
AEROSOL RESPIRATORY (INHALATION)
Qty: 10.2 G | Refills: 11 | Status: SHIPPED | OUTPATIENT
Start: 2025-03-25

## 2025-07-20 ENCOUNTER — ANCILLARY PROCEDURE (OUTPATIENT)
Dept: GENERAL RADIOLOGY | Facility: CLINIC | Age: 25
End: 2025-07-20
Attending: NURSE PRACTITIONER

## 2025-07-20 ENCOUNTER — OFFICE VISIT (OUTPATIENT)
Dept: URGENT CARE | Facility: URGENT CARE | Age: 25
End: 2025-07-20

## 2025-07-20 VITALS
OXYGEN SATURATION: 98 % | TEMPERATURE: 96.6 F | WEIGHT: 132.6 LBS | DIASTOLIC BLOOD PRESSURE: 67 MMHG | HEIGHT: 70 IN | SYSTOLIC BLOOD PRESSURE: 103 MMHG | RESPIRATION RATE: 20 BRPM | HEART RATE: 55 BPM | BODY MASS INDEX: 18.98 KG/M2

## 2025-07-20 DIAGNOSIS — M54.2 CERVICALGIA: ICD-10-CM

## 2025-07-20 DIAGNOSIS — M54.9 BACK PAIN DUE TO INJURY: ICD-10-CM
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
## 2025-07-20 PROCEDURE — 99215 OFFICE O/P EST HI 40 MIN: CPT | Performed by: NURSE PRACTITIONER

## 2025-07-20 PROCEDURE — 72040 X-RAY EXAM NECK SPINE 2-3 VW: CPT | Mod: TC | Performed by: RADIOLOGY

## 2025-07-20 ASSESSMENT — PAIN SCALES - GENERAL: PAINLEVEL_OUTOF10: SEVERE PAIN (10)
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
## 2025-07-21 ENCOUNTER — APPOINTMENT (OUTPATIENT)
Dept: CT IMAGING | Facility: CLINIC | Age: 25
End: 2025-07-21

## 2025-07-21 ENCOUNTER — APPOINTMENT (OUTPATIENT)
Dept: GENERAL RADIOLOGY | Facility: CLINIC | Age: 25
End: 2025-07-21

## 2025-07-21 ENCOUNTER — HOSPITAL ENCOUNTER (EMERGENCY)
Facility: CLINIC | Age: 25
Discharge: HOME | End: 2025-07-22
Attending: EMERGENCY MEDICINE
Payer: COMMERCIAL

## 2025-07-21 DIAGNOSIS — R42 LIGHTHEADEDNESS: ICD-10-CM

## 2025-07-21 LAB
ALBUMIN SERPL BCG-MCNC: 4.5 G/DL (ref 3.5–5.2)
ALP SERPL-CCNC: 45 U/L (ref 40–150)
ALT SERPL W P-5'-P-CCNC: 14 U/L (ref 0–70)
ANION GAP SERPL CALCULATED.3IONS-SCNC: 10 MMOL/L (ref 7–15)
AST SERPL W P-5'-P-CCNC: 22 U/L (ref 0–45)
BASOPHILS # BLD AUTO: 0 10E3/UL (ref 0–0.2)
BASOPHILS NFR BLD AUTO: 1 %
BILIRUB SERPL-MCNC: 0.4 MG/DL
BUN SERPL-MCNC: 16.9 MG/DL (ref 6–20)
CALCIUM SERPL-MCNC: 8.9 MG/DL (ref 8.8–10.4)
CHLORIDE SERPL-SCNC: 101 MMOL/L (ref 98–107)
CREAT SERPL-MCNC: 1.17 MG/DL (ref 0.67–1.17)
EGFRCR SERPLBLD CKD-EPI 2021: 89 ML/MIN/1.73M2
EOSINOPHIL # BLD AUTO: 0.3 10E3/UL (ref 0–0.7)
EOSINOPHIL NFR BLD AUTO: 4 %
ERYTHROCYTE [DISTWIDTH] IN BLOOD BY AUTOMATED COUNT: 12.3 % (ref 10–15)
GLUCOSE SERPL-MCNC: 99 MG/DL (ref 70–99)
HCO3 SERPL-SCNC: 27 MMOL/L (ref 22–29)
HCT VFR BLD AUTO: 42.3 % (ref 40–53)
HGB BLD-MCNC: 14.4 G/DL (ref 13.3–17.7)
IMM GRANULOCYTES # BLD: 0 10E3/UL
IMM GRANULOCYTES NFR BLD: 0 %
LYMPHOCYTES # BLD AUTO: 2.5 10E3/UL (ref 0.8–5.3)
LYMPHOCYTES NFR BLD AUTO: 38 %
MAGNESIUM SERPL-MCNC: 2.1 MG/DL (ref 1.7–2.3)
MCH RBC QN AUTO: 28.8 PG (ref 26.5–33)
MCHC RBC AUTO-ENTMCNC: 34 G/DL (ref 31.5–36.5)
MCV RBC AUTO: 85 FL (ref 78–100)
MONOCYTES # BLD AUTO: 0.5 10E3/UL (ref 0–1.3)
MONOCYTES NFR BLD AUTO: 7 %
NEUTROPHILS # BLD AUTO: 3.3 10E3/UL (ref 1.6–8.3)
NEUTROPHILS NFR BLD AUTO: 50 %
NRBC # BLD AUTO: 0 10E3/UL
NRBC BLD AUTO-RTO: 0 /100
PLATELET # BLD AUTO: 248 10E3/UL (ref 150–450)
POTASSIUM SERPL-SCNC: 4.2 MMOL/L (ref 3.4–5.3)
PROT SERPL-MCNC: 6.6 G/DL (ref 6.4–8.3)
RBC # BLD AUTO: 5 10E6/UL (ref 4.4–5.9)
SODIUM SERPL-SCNC: 138 MMOL/L (ref 135–145)
WBC # BLD AUTO: 6.6 10E3/UL (ref 4–11)

## 2025-07-21 PROCEDURE — 85004 AUTOMATED DIFF WBC COUNT: CPT

## 2025-07-21 PROCEDURE — 93010 ELECTROCARDIOGRAM REPORT: CPT | Performed by: EMERGENCY MEDICINE

## 2025-07-21 PROCEDURE — 96374 THER/PROPH/DIAG INJ IV PUSH: CPT | Performed by: EMERGENCY MEDICINE

## 2025-07-21 PROCEDURE — 83735 ASSAY OF MAGNESIUM: CPT

## 2025-07-21 PROCEDURE — 73140 X-RAY EXAM OF FINGER(S): CPT | Mod: RT

## 2025-07-21 PROCEDURE — 258N000003 HC RX IP 258 OP 636

## 2025-07-21 PROCEDURE — 93005 ELECTROCARDIOGRAM TRACING: CPT | Performed by: EMERGENCY MEDICINE

## 2025-07-21 PROCEDURE — 96361 HYDRATE IV INFUSION ADD-ON: CPT | Performed by: EMERGENCY MEDICINE

## 2025-07-21 PROCEDURE — 72125 CT NECK SPINE W/O DYE: CPT

## 2025-07-21 PROCEDURE — 80053 COMPREHEN METABOLIC PANEL: CPT

## 2025-07-21 PROCEDURE — 250N000013 HC RX MED GY IP 250 OP 250 PS 637

## 2025-07-21 PROCEDURE — 99284 EMERGENCY DEPT VISIT MOD MDM: CPT | Mod: GC | Performed by: EMERGENCY MEDICINE

## 2025-07-21 PROCEDURE — 36415 COLL VENOUS BLD VENIPUNCTURE: CPT

## 2025-07-21 PROCEDURE — 250N000011 HC RX IP 250 OP 636

## 2025-07-21 PROCEDURE — 99285 EMERGENCY DEPT VISIT HI MDM: CPT | Mod: 25 | Performed by: EMERGENCY MEDICINE

## 2025-07-21 RX ORDER — LIDOCAINE 4 G/G
1 PATCH TOPICAL ONCE
Status: DISCONTINUED | OUTPATIENT
Start: 2025-07-21 | End: 2025-07-22 | Stop reason: HOSPADM

## 2025-07-21 RX ORDER — KETOROLAC TROMETHAMINE 15 MG/ML
15 INJECTION, SOLUTION INTRAMUSCULAR; INTRAVENOUS ONCE
Status: COMPLETED | OUTPATIENT
Start: 2025-07-21 | End: 2025-07-21

## 2025-07-21 RX ADMIN — LIDOCAINE 1 PATCH: 4 PATCH TOPICAL at 22:43

## 2025-07-21 RX ADMIN — SODIUM CHLORIDE 1000 ML: 0.9 INJECTION, SOLUTION INTRAVENOUS at 23:54

## 2025-07-21 RX ADMIN — KETOROLAC TROMETHAMINE 15 MG: 15 INJECTION, SOLUTION INTRAMUSCULAR; INTRAVENOUS at 22:44

## 2025-07-21 ASSESSMENT — COLUMBIA-SUICIDE SEVERITY RATING SCALE - C-SSRS
2. HAVE YOU ACTUALLY HAD ANY THOUGHTS OF KILLING YOURSELF IN THE PAST MONTH?: NO
6. HAVE YOU EVER DONE ANYTHING, STARTED TO DO ANYTHING, OR PREPARED TO DO ANYTHING TO END YOUR LIFE?: NO
1. IN THE PAST MONTH, HAVE YOU WISHED YOU WERE DEAD OR WISHED YOU COULD GO TO SLEEP AND NOT WAKE UP?: NO

## 2025-07-21 ASSESSMENT — ACTIVITIES OF DAILY LIVING (ADL)
ADLS_ACUITY_SCORE: 41
ADLS_ACUITY_SCORE: 41

## 2025-07-22 VITALS
TEMPERATURE: 97.8 F | RESPIRATION RATE: 16 BRPM | HEART RATE: 59 BPM | DIASTOLIC BLOOD PRESSURE: 65 MMHG | SYSTOLIC BLOOD PRESSURE: 105 MMHG | HEIGHT: 70 IN | OXYGEN SATURATION: 99 % | BODY MASS INDEX: 20.04 KG/M2 | WEIGHT: 140 LBS

## 2025-07-22 LAB
ATRIAL RATE - MUSE: 55 BPM
DIASTOLIC BLOOD PRESSURE - MUSE: NORMAL MMHG
INTERPRETATION ECG - MUSE: NORMAL
P AXIS - MUSE: 63 DEGREES
PR INTERVAL - MUSE: 178 MS
QRS DURATION - MUSE: 82 MS
QT - MUSE: 420 MS
QTC - MUSE: 401 MS
R AXIS - MUSE: 70 DEGREES
SYSTOLIC BLOOD PRESSURE - MUSE: NORMAL MMHG
T AXIS - MUSE: 53 DEGREES
VENTRICULAR RATE- MUSE: 55 BPM

## 2025-07-22 NOTE — DISCHARGE INSTRUCTIONS
Continue plenty of fluids to keep yourself hydrated as this can cause symptoms of lightheadedness to worsen  Consider increasing salt in your diet to help with your symptoms  You may utilize Tylenol and ibuprofen as needed for continued pain in your neck and back  A referral for PCP follow-up was sent from the ED today so they will call you to set up this outpatient appointment  Otherwise, do not hesitate to seek urgent or emergent reevaluation if you have any worsening or concerning signs or symptoms

## 2025-07-22 NOTE — ED TRIAGE NOTES
Triage Assessment (Adult)       Row Name 07/21/25 2118          Triage Assessment    Airway WDL WDL        Respiratory WDL    Respiratory WDL WDL        Skin Circulation/Temperature WDL    Skin Circulation/Temperature WDL WDL        Cardiac WDL    Cardiac WDL WDL        Peripheral/Neurovascular WDL    Peripheral Neurovascular WDL WDL        Cognitive/Neuro/Behavioral WDL    Cognitive/Neuro/Behavioral WDL WDL
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
